# Patient Record
Sex: FEMALE | Race: WHITE | NOT HISPANIC OR LATINO | Employment: FULL TIME
[De-identification: names, ages, dates, MRNs, and addresses within clinical notes are randomized per-mention and may not be internally consistent; named-entity substitution may affect disease eponyms.]

---

## 2018-04-10 RX ORDER — LEVOTHYROXINE SODIUM 100 UG/1
100 TABLET ORAL
COMMUNITY

## 2018-04-10 RX ORDER — NAPROXEN SODIUM 220 MG/1
81 TABLET, FILM COATED ORAL
COMMUNITY
End: 2022-12-14 | Stop reason: ALTCHOICE

## 2018-04-26 ENCOUNTER — TRANSCRIBE ORDERS (OUTPATIENT)
Dept: SCHEDULING | Age: 53
End: 2018-04-26

## 2018-04-26 ENCOUNTER — TELEPHONE (OUTPATIENT)
Dept: OBSTETRICS AND GYNECOLOGY | Facility: CLINIC | Age: 53
End: 2018-04-26

## 2018-04-26 DIAGNOSIS — N64.9 DISORDER OF BREAST: Primary | ICD-10-CM

## 2018-04-26 NOTE — TELEPHONE ENCOUNTER
"ARSLAN,    PT WOULD LIKE TO KNOW IF WE COULD PUT A ORDER IN THE \"SYSTEM\" FOR A MAMMOGRAM SO SHE CAN SCHEDULE It.    THANKSSELENA"

## 2018-05-10 ENCOUNTER — OFFICE VISIT (OUTPATIENT)
Dept: OBSTETRICS AND GYNECOLOGY | Facility: CLINIC | Age: 53
End: 2018-05-10
Attending: OBSTETRICS & GYNECOLOGY
Payer: COMMERCIAL

## 2018-05-10 DIAGNOSIS — Z11.51 SCREENING FOR HUMAN PAPILLOMAVIRUS (HPV): ICD-10-CM

## 2018-05-10 DIAGNOSIS — Z13.29 SCREENING FOR THYROID DISORDER: ICD-10-CM

## 2018-05-10 DIAGNOSIS — Z13.29 SCREENING FOR ENDOCRINE, METABOLIC AND IMMUNITY DISORDER: ICD-10-CM

## 2018-05-10 DIAGNOSIS — Z13.1 SCREENING FOR DIABETES MELLITUS: ICD-10-CM

## 2018-05-10 DIAGNOSIS — N95.1 MENOPAUSE SYNDROME: ICD-10-CM

## 2018-05-10 DIAGNOSIS — Z13.228 SCREENING FOR ENDOCRINE, METABOLIC AND IMMUNITY DISORDER: ICD-10-CM

## 2018-05-10 DIAGNOSIS — Z13.220 SCREENING, LIPID: ICD-10-CM

## 2018-05-10 DIAGNOSIS — Z13.0 SCREENING FOR ENDOCRINE, METABOLIC AND IMMUNITY DISORDER: ICD-10-CM

## 2018-05-10 DIAGNOSIS — Z13.0 SCREENING, ANEMIA, DEFICIENCY, IRON: ICD-10-CM

## 2018-05-10 DIAGNOSIS — Z13.820 SCREENING FOR OSTEOPOROSIS: ICD-10-CM

## 2018-05-10 DIAGNOSIS — Z01.419 WELL FEMALE EXAM WITH ROUTINE GYNECOLOGICAL EXAM: Primary | ICD-10-CM

## 2018-05-10 DIAGNOSIS — Z01.419 PAP SMEAR, LOW-RISK: ICD-10-CM

## 2018-05-10 DIAGNOSIS — Z12.39 SCREENING BREAST EXAMINATION: ICD-10-CM

## 2018-05-10 DIAGNOSIS — Z12.4 ROUTINE CERVICAL SMEAR: ICD-10-CM

## 2018-05-10 PROCEDURE — 81002 URINALYSIS NONAUTO W/O SCOPE: CPT | Performed by: OBSTETRICS & GYNECOLOGY

## 2018-05-10 PROCEDURE — S0612 ANNUAL GYNECOLOGICAL EXAMINA: HCPCS | Performed by: OBSTETRICS & GYNECOLOGY

## 2018-05-10 NOTE — PROGRESS NOTES
5/10/2018  Elisabet Kay is a 52 y.o. female who presents for annual exam.     HPI depression doing well on zoloft 25 mg. Ativan for sleep. Takes conserta.   Total abdominal hysterectomy 2012, for micro invasion of the cervix  hashimoto thyroiditis   Prior dvt    Review of Systems    Mood-  Heart/Lung- no issues  Breast- no issues  Urine/GI- no issues  Menopause-no issues  Menstruation- no issues      Current contraception: no  Last Pap Test:11/03/2016 NORMAL HPV NEGATIVE  Last Mammogram:02/03/2017  Last Dexascan:NONE  Last Colonoscopy:NONE    Family History   Problem Relation Age of Onset   • Uterine cancer Mother        Family history of:  uterine mom    Family history of heart disease:  no  Menstrual History:  OB History     No data available         No LMP recorded. Patient is postmenopausal.       Review of Systems and Physical Exam  The following have been reviewed and updated as appropriate in this visit:      There were no vitals taken for this visit.     OBGyn Exam   Normal exam    Assessment and Plan  To get dexa, labs and mammogram    No orders of the defined types were placed in this encounter.      Diagnoses and all orders for this visit:    Well female exam with routine gynecological exam  -     Cytology, Thinprep Pap  -     POCT urinalysis dipstick  -     PAP AND HR HPV W/REFLEX TO GENOTYPING 16,18/45  -     CBC and Differential; Future  -     Comprehensive metabolic panel; Future  -     Estradiol, Free; Future  -     Follicle stimulating hormone; Future  -     Hemoglobin A1c; Future  -     Lipid panel; Future  -     Progesterone; Future  -     T4, free; Future  -     TSH 3rd Generation; Future  -     Vitamin D 25 hydroxy; Future    Routine cervical smear  -     Cytology, Thinprep Pap  -     PAP AND HR HPV W/REFLEX TO GENOTYPING 16,18/45    Screening for human papillomavirus (HPV)  -     Cytology, Thinprep Pap  -     PAP AND HR HPV W/REFLEX TO GENOTYPING 16,18/45    Pap smear, low-risk  -      Cytology, Thinprep Pap  -     PAP AND HR HPV W/REFLEX TO GENOTYPING 16,18/45    Menopause syndrome  -     CBC and Differential; Future  -     Comprehensive metabolic panel; Future  -     Estradiol, Free; Future  -     Follicle stimulating hormone; Future  -     Hemoglobin A1c; Future  -     Lipid panel; Future  -     Progesterone; Future  -     T4, free; Future  -     TSH 3rd Generation; Future  -     Vitamin D 25 hydroxy; Future    Screening, lipid  -     CBC and Differential; Future  -     Comprehensive metabolic panel; Future  -     Estradiol, Free; Future  -     Follicle stimulating hormone; Future  -     Hemoglobin A1c; Future  -     Lipid panel; Future  -     Progesterone; Future  -     T4, free; Future  -     TSH 3rd Generation; Future  -     Vitamin D 25 hydroxy; Future    Screening for endocrine, metabolic and immunity disorder  -     CBC and Differential; Future  -     Comprehensive metabolic panel; Future  -     Estradiol, Free; Future  -     Follicle stimulating hormone; Future  -     Hemoglobin A1c; Future  -     Lipid panel; Future  -     Progesterone; Future  -     T4, free; Future  -     TSH 3rd Generation; Future  -     Vitamin D 25 hydroxy; Future    Screening for thyroid disorder  -     CBC and Differential; Future  -     Comprehensive metabolic panel; Future  -     Estradiol, Free; Future  -     Follicle stimulating hormone; Future  -     Hemoglobin A1c; Future  -     Lipid panel; Future  -     Progesterone; Future  -     T4, free; Future  -     TSH 3rd Generation; Future  -     Vitamin D 25 hydroxy; Future    Screening for diabetes mellitus  -     CBC and Differential; Future  -     Comprehensive metabolic panel; Future  -     Estradiol, Free; Future  -     Follicle stimulating hormone; Future  -     Hemoglobin A1c; Future  -     Lipid panel; Future  -     Progesterone; Future  -     T4, free; Future  -     TSH 3rd Generation; Future  -     Vitamin D 25 hydroxy; Future    Screening, anemia,  deficiency, iron  -     CBC and Differential; Future  -     Comprehensive metabolic panel; Future  -     Estradiol, Free; Future  -     Follicle stimulating hormone; Future  -     Hemoglobin A1c; Future  -     Lipid panel; Future  -     Progesterone; Future  -     T4, free; Future  -     TSH 3rd Generation; Future  -     Vitamin D 25 hydroxy; Future    Screening breast examination  -     BI SCREENING MAMMOGRAM BILATERAL; Future    Screening for osteoporosis  -     DEXA BONE DENSITY; Future  .  No Follow-up on file.  Amee Traore MA

## 2018-05-15 LAB
CYTOLOGIST CVX/VAG CYTO: NORMAL
CYTOLOGY CVX/VAG DOC THIN PREP: NORMAL
DX ICD CODE: NORMAL
HPV I/H RISK 4 DNA CVX QL PROBE+SIG AMP: NEGATIVE
LAB CORP NOTE:: NORMAL
OTHER STN SPEC: NORMAL
PATH REPORT.FINAL DX SPEC: NORMAL
STAT OF ADQ CVX/VAG CYTO-IMP: NORMAL

## 2018-07-17 ENCOUNTER — HOSPITAL ENCOUNTER (OUTPATIENT)
Dept: RADIOLOGY | Age: 53
Discharge: HOME | End: 2018-07-17
Attending: OBSTETRICS & GYNECOLOGY
Payer: COMMERCIAL

## 2018-07-17 DIAGNOSIS — Z12.39 SCREENING BREAST EXAMINATION: ICD-10-CM

## 2018-07-17 PROCEDURE — 77066 DX MAMMO INCL CAD BI: CPT

## 2018-07-26 ENCOUNTER — TELEPHONE (OUTPATIENT)
Dept: OBSTETRICS AND GYNECOLOGY | Facility: CLINIC | Age: 53
End: 2018-07-26

## 2018-10-03 RX ORDER — SERTRALINE HYDROCHLORIDE 25 MG/1
25 TABLET, FILM COATED ORAL DAILY
Qty: 90 TABLET | Refills: 1 | Status: SHIPPED | OUTPATIENT
Start: 2018-10-03 | End: 2019-07-26 | Stop reason: SDUPTHER

## 2019-05-02 ENCOUNTER — TELEPHONE (OUTPATIENT)
Dept: OBSTETRICS AND GYNECOLOGY | Facility: CLINIC | Age: 54
End: 2019-05-02

## 2019-05-16 ENCOUNTER — OFFICE VISIT (OUTPATIENT)
Dept: OBSTETRICS AND GYNECOLOGY | Facility: CLINIC | Age: 54
End: 2019-05-16
Payer: COMMERCIAL

## 2019-05-16 VITALS
SYSTOLIC BLOOD PRESSURE: 118 MMHG | HEIGHT: 69 IN | DIASTOLIC BLOOD PRESSURE: 78 MMHG | BODY MASS INDEX: 23.25 KG/M2 | WEIGHT: 157 LBS

## 2019-05-16 DIAGNOSIS — E55.9 VITAMIN D DEFICIENCY: ICD-10-CM

## 2019-05-16 DIAGNOSIS — R23.2 HOT FLASHES: ICD-10-CM

## 2019-05-16 DIAGNOSIS — Z84.0 FAMILY HISTORY OF DISCOID LUPUS: ICD-10-CM

## 2019-05-16 DIAGNOSIS — Z13.228 SCREENING FOR PHENYLKETONURIA (PKU): ICD-10-CM

## 2019-05-16 DIAGNOSIS — Z13.29 SCREENING FOR THYROID DISORDER: ICD-10-CM

## 2019-05-16 DIAGNOSIS — Z13.1 SCREENING FOR DIABETES MELLITUS: ICD-10-CM

## 2019-05-16 DIAGNOSIS — Z11.51 SPECIAL SCREENING EXAMINATION FOR HUMAN PAPILLOMAVIRUS (HPV): ICD-10-CM

## 2019-05-16 DIAGNOSIS — Z01.419 WELL FEMALE EXAM WITH ROUTINE GYNECOLOGICAL EXAM: Primary | ICD-10-CM

## 2019-05-16 DIAGNOSIS — Z12.4 ROUTINE CERVICAL SMEAR: ICD-10-CM

## 2019-05-16 PROCEDURE — S0612 ANNUAL GYNECOLOGICAL EXAMINA: HCPCS | Performed by: OBSTETRICS & GYNECOLOGY

## 2019-05-18 LAB
CYTOLOGIST CVX/VAG CYTO: NORMAL
CYTOLOGY CVX/VAG DOC CYTO: NORMAL
DX ICD CODE: NORMAL
LAB CORP NOTE:: NORMAL
OTHER STN SPEC: NORMAL
STAT OF ADQ CVX/VAG CYTO-IMP: NORMAL

## 2019-05-20 ENCOUNTER — TELEPHONE (OUTPATIENT)
Dept: OBSTETRICS AND GYNECOLOGY | Facility: CLINIC | Age: 54
End: 2019-05-20

## 2019-05-22 LAB
ALBUMIN SERPL-MCNC: 4.1 G/DL (ref 3.5–5.5)
ALBUMIN/GLOB SERPL: 1.6 {RATIO} (ref 1.2–2.2)
ALP SERPL-CCNC: 49 IU/L (ref 39–117)
ALT SERPL-CCNC: 15 IU/L (ref 0–32)
AST SERPL-CCNC: 14 IU/L (ref 0–40)
BILIRUB SERPL-MCNC: 0.3 MG/DL (ref 0–1.2)
BUN SERPL-MCNC: 11 MG/DL (ref 6–24)
BUN/CREAT SERPL: 13 (ref 9–23)
CALCIUM SERPL-MCNC: 9 MG/DL (ref 8.7–10.2)
CHLORIDE SERPL-SCNC: 104 MMOL/L (ref 96–106)
CO2 SERPL-SCNC: 23 MMOL/L (ref 20–29)
CREAT SERPL-MCNC: 0.84 MG/DL (ref 0.57–1)
ERYTHROCYTE [DISTWIDTH] IN BLOOD BY AUTOMATED COUNT: 12.4 % (ref 12.3–15.4)
GLOBULIN SER CALC-MCNC: 2.5 G/DL (ref 1.5–4.5)
GLUCOSE SERPL-MCNC: 104 MG/DL (ref 65–99)
HCT VFR BLD AUTO: 38.3 % (ref 34–46.6)
HGB BLD-MCNC: 13 G/DL (ref 11.1–15.9)
LAB CORP EGFR IF AFRICN AM: 92 ML/MIN/1.73
LAB CORP EGFR IF NONAFRICN AM: 80 ML/MIN/1.73
MCH RBC QN AUTO: 31.1 PG (ref 26.6–33)
MCHC RBC AUTO-ENTMCNC: 33.9 G/DL (ref 31.5–35.7)
MCV RBC AUTO: 92 FL (ref 79–97)
PLATELET # BLD AUTO: 290 X10E3/UL (ref 150–450)
POTASSIUM SERPL-SCNC: 4.2 MMOL/L (ref 3.5–5.2)
PROT SERPL-MCNC: 6.6 G/DL (ref 6–8.5)
RBC # BLD AUTO: 4.18 X10E6/UL (ref 3.77–5.28)
SODIUM SERPL-SCNC: 140 MMOL/L (ref 134–144)
WBC # BLD AUTO: 4.9 X10E3/UL (ref 3.4–10.8)

## 2019-05-23 LAB
25(OH)D3+25(OH)D2 SERPL-MCNC: 30.3 NG/ML (ref 30–100)
ANA TITR SER IF: NEGATIVE {TITER}
CHOLEST SERPL-MCNC: 191 MG/DL (ref 100–199)
ESTRADIOL SERPL-MCNC: 5.3 PG/ML
FSH SERPL-ACNC: 135.8 MIU/ML
HBA1C MFR BLD: 5.5 % (ref 4.8–5.6)
HCV AB S/CO SERPL IA: 0.2 S/CO RATIO (ref 0–0.9)
HDLC SERPL-MCNC: 74 MG/DL
LAB CORP PLEASE NOTE:: NORMAL
LDLC SERPL CALC-MCNC: 108 MG/DL (ref 0–99)
LH SERPL-ACNC: 58.6 MIU/ML
T4 FREE SERPL-MCNC: 1.57 NG/DL (ref 0.82–1.77)
TRIGL SERPL-MCNC: 45 MG/DL (ref 0–149)
TSH SERPL DL<=0.005 MIU/L-ACNC: 1.63 UIU/ML (ref 0.45–4.5)
VLDLC SERPL CALC-MCNC: 9 MG/DL (ref 5–40)

## 2019-05-28 ENCOUNTER — TELEPHONE (OUTPATIENT)
Dept: OBSTETRICS AND GYNECOLOGY | Facility: CLINIC | Age: 54
End: 2019-05-28

## 2019-05-28 NOTE — TELEPHONE ENCOUNTER
Pt had blood work done 5/17/19.  She is looking for results .  **pt aware dr muñoz is out of office**

## 2019-05-31 ENCOUNTER — TRANSCRIBE ORDERS (OUTPATIENT)
Dept: SCHEDULING | Age: 54
End: 2019-05-31

## 2019-05-31 DIAGNOSIS — E28.39 OTHER PRIMARY OVARIAN FAILURE: Primary | ICD-10-CM

## 2019-07-26 RX ORDER — SERTRALINE HYDROCHLORIDE 25 MG/1
25 TABLET, FILM COATED ORAL DAILY
Qty: 90 TABLET | Refills: 1 | Status: SHIPPED | OUTPATIENT
Start: 2019-07-26 | End: 2020-01-22 | Stop reason: SDUPTHER

## 2020-01-22 RX ORDER — SERTRALINE HYDROCHLORIDE 25 MG/1
25 TABLET, FILM COATED ORAL DAILY
Qty: 90 TABLET | Refills: 0 | Status: SHIPPED | OUTPATIENT
Start: 2020-01-22 | End: 2020-04-08 | Stop reason: SDUPTHER

## 2020-01-28 ENCOUNTER — TELEPHONE (OUTPATIENT)
Dept: OBSTETRICS AND GYNECOLOGY | Facility: CLINIC | Age: 55
End: 2020-01-28

## 2020-01-28 RX ORDER — LORAZEPAM 1 MG/1
1 TABLET ORAL EVERY 6 HOURS PRN
Qty: 50 TABLET | Refills: 1 | Status: SHIPPED | OUTPATIENT
Start: 2020-01-28 | End: 2021-02-06

## 2020-04-08 ENCOUNTER — TELEPHONE (OUTPATIENT)
Dept: OBSTETRICS AND GYNECOLOGY | Facility: CLINIC | Age: 55
End: 2020-04-08

## 2020-04-08 RX ORDER — SERTRALINE HYDROCHLORIDE 25 MG/1
25 TABLET, FILM COATED ORAL DAILY
Qty: 90 TABLET | Refills: 0 | Status: SHIPPED | OUTPATIENT
Start: 2020-04-08 | End: 2020-06-01 | Stop reason: SDUPTHER

## 2020-06-01 RX ORDER — SERTRALINE HYDROCHLORIDE 25 MG/1
25 TABLET, FILM COATED ORAL DAILY
Qty: 90 TABLET | Refills: 0 | Status: SHIPPED | OUTPATIENT
Start: 2020-06-01 | End: 2020-08-31 | Stop reason: SDUPTHER

## 2020-06-29 ENCOUNTER — TELEPHONE (OUTPATIENT)
Dept: OBSTETRICS AND GYNECOLOGY | Facility: CLINIC | Age: 55
End: 2020-06-29

## 2020-06-29 NOTE — TELEPHONE ENCOUNTER
Pt requested refill for lorazepam. Pt last visit 5/19/19. Pt scheduled for telemed visit on 6/30/20 @ 9:45am.

## 2020-06-30 ENCOUNTER — TELEMEDICINE (OUTPATIENT)
Dept: OBSTETRICS AND GYNECOLOGY | Facility: CLINIC | Age: 55
End: 2020-06-30
Payer: COMMERCIAL

## 2020-06-30 DIAGNOSIS — Z79.899 MEDICATION MANAGEMENT: Primary | ICD-10-CM

## 2020-06-30 DIAGNOSIS — Z12.31 SCREENING MAMMOGRAM, ENCOUNTER FOR: Primary | ICD-10-CM

## 2020-06-30 PROCEDURE — 99212 OFFICE O/P EST SF 10 MIN: CPT | Mod: 95 | Performed by: OBSTETRICS & GYNECOLOGY

## 2020-06-30 RX ORDER — LORAZEPAM 1 MG/1
1 TABLET ORAL EVERY 6 HOURS PRN
Qty: 50 TABLET | Refills: 1 | Status: SHIPPED | OUTPATIENT
Start: 2020-06-30 | End: 2021-02-06

## 2020-06-30 NOTE — PROGRESS NOTES
Verification of Patient Location:  The patient affirms they are currently located in the following state:[place]     Request for Consent:  You and I are about to have a telemedicine check-in or visit. This is allowed because you are already my patient, and you have requested it.  This telemedicine visit will be billed to your health insurance or you, if you are self-insured.  You understand you will be responsible for any copayments or coinsurances that apply to your telemedicine visit.  Before starting our telemedicine visit, I am required to get your consent for this virtual check-in or visit by telemedicine. Do you consent?      Patient Response to Request for Consent: Yes    The following have been reviewed and updated as appropriate in this visit:         Visit Documentation:  Spoke to the patient Elisabet who is been on Ativan utilizing the medication sparingly she is already on medications for ADD written by her primary care Ativan is utilizes for sleep on occasion within the professionally therapy she did not fill in concerns regarding taking the Ativan for renewal        Time Spent in Medical Discussion During This Encounter:  [id number]

## 2020-08-31 RX ORDER — SERTRALINE HYDROCHLORIDE 25 MG/1
25 TABLET, FILM COATED ORAL DAILY
Qty: 90 TABLET | Refills: 0 | Status: SHIPPED | OUTPATIENT
Start: 2020-08-31 | End: 2020-11-19 | Stop reason: SDUPTHER

## 2020-09-17 ENCOUNTER — OFFICE VISIT (OUTPATIENT)
Dept: OBSTETRICS AND GYNECOLOGY | Facility: CLINIC | Age: 55
End: 2020-09-17
Payer: COMMERCIAL

## 2020-09-17 VITALS
WEIGHT: 157 LBS | SYSTOLIC BLOOD PRESSURE: 110 MMHG | BODY MASS INDEX: 23.25 KG/M2 | HEIGHT: 69 IN | DIASTOLIC BLOOD PRESSURE: 84 MMHG

## 2020-09-17 DIAGNOSIS — Z01.419 WELL WOMAN EXAM WITH ROUTINE GYNECOLOGICAL EXAM: ICD-10-CM

## 2020-09-17 DIAGNOSIS — Z12.39 BREAST CANCER SCREENING: ICD-10-CM

## 2020-09-17 DIAGNOSIS — Z12.4 ROUTINE CERVICAL SMEAR: Primary | ICD-10-CM

## 2020-09-17 PROCEDURE — 90471 IMMUNIZATION ADMIN: CPT | Performed by: OBSTETRICS & GYNECOLOGY

## 2020-09-17 PROCEDURE — S0612 ANNUAL GYNECOLOGICAL EXAMINA: HCPCS | Performed by: OBSTETRICS & GYNECOLOGY

## 2020-09-17 PROCEDURE — 90686 IIV4 VACC NO PRSV 0.5 ML IM: CPT | Performed by: OBSTETRICS & GYNECOLOGY

## 2020-09-17 NOTE — PROGRESS NOTES
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    Her mood she is on Concerta prescribed by her primary care doctor along with that she takes Zoloft on and off    Heart lungs are fine    Gynecologic she had a total abdominal hysterectomy 2012 for microinvasion of endocervical cancer she has had normal Paps ever since she still retains her ovaries she has no menopausal symptoms denies any vaginal dryness    Mammogram July 2018 category C    Colonoscopy 2 years ago and normal    Labs we reviewed by telephone with her PCP all appear to be normal    Family history for endometrial cancer dad with prostate    No history for coronary artery disease this patient an LDL of 113 total cholesterol 211 HDL of 81    An echo neck was supple none masses    Breast exam thickened tissue bilaterally but no masses    Abdomen soft nontender    Uterus is surgically missing no adnexal masses good vaginal vault support Pap test taken    Heme deferred    Normal checkup we will see the patient back in 1 year understands the importance of getting a follow-up mammogram

## 2020-09-21 LAB
CYTOLOGIST CVX/VAG CYTO: NORMAL
CYTOLOGY CVX/VAG DOC CYTO: NORMAL
CYTOLOGY CVX/VAG DOC THIN PREP: NORMAL
DX ICD CODE: NORMAL
HPV I/H RISK 4 DNA CVX QL PROBE+SIG AMP: NEGATIVE
LAB CORP NOTE:: NORMAL
OTHER STN SPEC: NORMAL
STAT OF ADQ CVX/VAG CYTO-IMP: NORMAL

## 2020-10-20 ENCOUNTER — TELEPHONE (OUTPATIENT)
Dept: OBSTETRICS AND GYNECOLOGY | Facility: CLINIC | Age: 55
End: 2020-10-20

## 2020-10-21 RX ORDER — LORAZEPAM 1 MG/1
1 TABLET ORAL EVERY 8 HOURS PRN
Qty: 50 TABLET | Refills: 1 | Status: SHIPPED | OUTPATIENT
Start: 2020-10-21 | End: 2021-02-06

## 2020-10-23 ENCOUNTER — TELEPHONE (OUTPATIENT)
Dept: INTENSIVE CARE | Facility: HOSPITAL | Age: 55
End: 2020-10-23

## 2020-10-23 ENCOUNTER — TRANSCRIBE ORDERS (OUTPATIENT)
Dept: OTHER | Facility: CLINIC | Age: 55
End: 2020-10-23

## 2020-10-23 ENCOUNTER — CLINICAL SUPPORT (OUTPATIENT)
Dept: OTHER | Facility: CLINIC | Age: 55
End: 2020-10-23

## 2020-10-23 DIAGNOSIS — R53.83 OTHER FATIGUE: ICD-10-CM

## 2020-10-23 DIAGNOSIS — J02.9 ACUTE PHARYNGITIS, UNSPECIFIED: ICD-10-CM

## 2020-10-23 DIAGNOSIS — J02.9 ACUTE PHARYNGITIS, UNSPECIFIED: Primary | ICD-10-CM

## 2020-10-23 NOTE — PROGRESS NOTES
Patient was processed today at [facility]-19 drive-up clinic.  Pt denies any sort of medical distress today.  After identifying the patient, a nasopharyngeal sample was obtained and placed in viral transport medium.  Pt was given a post-collection education sheet and encouraged to self-isolate until they receive the results.  Pt directed to [facility] website for [facility] Privacy Practices.  Sample sent to the lab noted on the order and requisition.  Pt was without additional questions or concerns and was dispositioned from the testing area to home.

## 2020-10-23 NOTE — TELEPHONE ENCOUNTER
Please schedule this patient for Covid 19 testing.  I have updated the patient's demographic information with the patient's contact information for scheduling.    Patient symptoms:Shortness of breath, Congestion / Runny Nose, Sore throat and Fatigue    Provider is: Provider Select: Independent Provider  (Independent providers with Epic access SHOULD NOT put orders in Epic, results will not route)    Ordering provider (First - Last): PCP  Provider Best Callback # for NSQ: [phone]    This patient is a [employer] Employee: YES/NO/NA: No  If yes: notify Employee Exposure line and advise ordering provider that Employee's also need to call that team.    If an HC provider, advised order needs to be in Epic.    If an independent provider, they are directed to fax the order to [phone]  (Fax should include: patient name, date of birth, Covid 19 [facility] order, ICD-10 for symptoms, and either [facility] account number or fax number for [facility] to send results)    Independent provider results will arrive via ShowEvidence   [facility] provider results will route via Epic

## 2020-11-19 ENCOUNTER — TELEPHONE (OUTPATIENT)
Dept: OBSTETRICS AND GYNECOLOGY | Facility: CLINIC | Age: 55
End: 2020-11-19

## 2020-11-19 RX ORDER — SERTRALINE HYDROCHLORIDE 25 MG/1
25 TABLET, FILM COATED ORAL DAILY
Qty: 90 TABLET | Refills: 0 | Status: SHIPPED | OUTPATIENT
Start: 2020-11-19 | End: 2020-11-23 | Stop reason: SDUPTHER

## 2020-11-23 RX ORDER — SERTRALINE HYDROCHLORIDE 25 MG/1
25 TABLET, FILM COATED ORAL DAILY
Qty: 90 TABLET | Refills: 0 | Status: SHIPPED | OUTPATIENT
Start: 2020-11-23 | End: 2021-04-19 | Stop reason: SDUPTHER

## 2021-02-06 RX ORDER — LORAZEPAM 1 MG/1
1 TABLET ORAL NIGHTLY PRN
Qty: 2 TABLET | Refills: 0 | Status: SHIPPED | OUTPATIENT
Start: 2021-02-06 | End: 2023-10-10

## 2021-02-06 NOTE — PROGRESS NOTES
Pt called for refill of Ativan. Tried calling office earlier in week but never heard back. This is a controlled substance and pt is unknown to me. Will refill through Monday at which point she needs to speak with Dr. Currie's office for further refills. PDMP query performed.

## 2021-02-09 ENCOUNTER — TELEMEDICINE (OUTPATIENT)
Dept: OBSTETRICS AND GYNECOLOGY | Facility: CLINIC | Age: 56
End: 2021-02-09
Payer: COMMERCIAL

## 2021-02-09 DIAGNOSIS — Z79.899 MEDICATION MANAGEMENT: Primary | ICD-10-CM

## 2021-02-09 PROCEDURE — 99212 OFFICE O/P EST SF 10 MIN: CPT | Mod: 95 | Performed by: OBSTETRICS & GYNECOLOGY

## 2021-02-09 RX ORDER — LORAZEPAM 1 MG/1
1 TABLET ORAL EVERY 6 HOURS PRN
Qty: 30 TABLET | Refills: 0 | Status: SHIPPED | OUTPATIENT
Start: 2021-02-09 | End: 2022-12-14 | Stop reason: ALTCHOICE

## 2021-02-09 NOTE — PROGRESS NOTES
Verification of Patient Location:  The patient affirms they are currently located in the following state:[place]     Request for Consent:  You and I are about to have a telemedicine check-in or visit. This is allowed because you are already my patient, and you have requested it.  This telemedicine visit will be billed to your health insurance or you, if you are self-insured.  You understand you will be responsible for any copayments or coinsurances that apply to your telemedicine visit.  Before starting our telemedicine visit, I am required to get your consent for this virtual check-in or visit by telemedicine. Do you consent?      Patient Response to Request for Consent: Yes    The following have been reviewed and updated as appropriate in this visit:         Visit Documentation:  Patient has been taking Ativan for quite some time for sleep and anxiety she has had no psychological or physical additions with medications written for 30 days after receipt of the responsibility back over to her medical doctor since she is on ADD drug I think the combination is best managed by her medical doc        Time Spent:  I spent 8 minutes on this date of service performing the following activities: obtaining history and coordinating care.

## 2021-04-19 ENCOUNTER — TELEPHONE (OUTPATIENT)
Dept: OBSTETRICS AND GYNECOLOGY | Facility: CLINIC | Age: 56
End: 2021-04-19

## 2021-04-19 RX ORDER — SERTRALINE HYDROCHLORIDE 25 MG/1
25 TABLET, FILM COATED ORAL DAILY
Qty: 90 TABLET | Refills: 0 | Status: SHIPPED | OUTPATIENT
Start: 2021-04-19 | End: 2021-07-26 | Stop reason: SDUPTHER

## 2021-04-19 NOTE — TELEPHONE ENCOUNTER
He Araceli sure what this request for medication is could have been just automatically generated Elisabet over the last 3 months has been getting her Ativan for her from her primary care doctor along with her Concerta

## 2021-04-19 NOTE — TELEPHONE ENCOUNTER
Medicine Refill Request    Last Office Visit: 9/17/2020  Last Telemedicine Visit: 2/9/2021 Rupesh Currie MD    Next Office Visit: Visit date not found  Next Telemedicine Visit: Visit date not found         Current Outpatient Medications:   •  aspirin 81 mg chewable tablet, Take 81 mg by mouth., Disp: , Rfl:   •  levothyroxine (SYNTHROID) 100 mcg tablet, Take 100 mcg by mouth., Disp: , Rfl:   •  LORazepam (ATIVAN) 1 mg tablet, Take 1 tablet (1 mg total) by mouth nightly as needed for sleep for up to 2 days., Disp: 2 tablet, Rfl: 0  •  LORazepam (ATIVAN) 1 mg tablet, Take 1 tablet (1 mg total) by mouth every 6 (six) hours as needed for anxiety., Disp: 30 tablet, Rfl: 0  •  METHYLPHENIDATE HCL (CONCERTA ORAL), Take by mouth., Disp: , Rfl:   •  sertraline (ZOLOFT) 25 mg tablet, Take 1 tablet (25 mg total) by mouth daily., Disp: 90 tablet, Rfl: 0      BP Readings from Last 3 Encounters:   09/17/20 110/84   05/16/19 118/78       Recent Lab results:  Lab Results   Component Value Date    CHOL 191 05/22/2019   ,   Lab Results   Component Value Date    HDL 74 05/22/2019   ,   Lab Results   Component Value Date    LDLCALC 108 (H) 05/22/2019   ,   Lab Results   Component Value Date    TRIG 45 05/22/2019        Lab Results   Component Value Date    GLUCOSE 104 (H) 05/22/2019   ,   Lab Results   Component Value Date    HGBA1C 5.5 05/22/2019         Lab Results   Component Value Date    CREATININE 0.84 05/22/2019       Lab Results   Component Value Date    TSH 1.630 05/22/2019      PT WOULD LIKE A REFILL ON SERTRALINE HCL 25MG

## 2021-04-20 ENCOUNTER — TELEPHONE (OUTPATIENT)
Dept: OBSTETRICS AND GYNECOLOGY | Facility: CLINIC | Age: 56
End: 2021-04-20

## 2021-04-20 NOTE — TELEPHONE ENCOUNTER
Patient called stating Dr. Currie asked her to call the office to let us know that he is not refilling any medications. Her primary care is

## 2021-07-26 RX ORDER — SERTRALINE HYDROCHLORIDE 25 MG/1
25 TABLET, FILM COATED ORAL DAILY
Qty: 90 TABLET | Refills: 0 | Status: SHIPPED | OUTPATIENT
Start: 2021-07-26 | End: 2021-11-29 | Stop reason: SDUPTHER

## 2021-11-15 ENCOUNTER — TELEPHONE (OUTPATIENT)
Dept: OBSTETRICS AND GYNECOLOGY | Facility: CLINIC | Age: 56
End: 2021-11-15

## 2021-11-15 NOTE — TELEPHONE ENCOUNTER
Patient is requesting screening mammogram and dexa scan orderr to be placed on her chart.   Thanks

## 2021-11-18 ENCOUNTER — HOSPITAL ENCOUNTER (OUTPATIENT)
Dept: RADIOLOGY | Age: 56
Discharge: HOME | End: 2021-11-18
Attending: OBSTETRICS & GYNECOLOGY
Payer: COMMERCIAL

## 2021-11-18 DIAGNOSIS — Z12.31 ENCOUNTER FOR SCREENING MAMMOGRAM FOR MALIGNANT NEOPLASM OF BREAST: ICD-10-CM

## 2021-11-18 DIAGNOSIS — Z12.31 ENCOUNTER FOR SCREENING MAMMOGRAM FOR MALIGNANT NEOPLASM OF BREAST: Primary | ICD-10-CM

## 2021-11-18 PROCEDURE — 77067 SCR MAMMO BI INCL CAD: CPT

## 2021-11-18 PROCEDURE — 77063 BREAST TOMOSYNTHESIS BI: CPT

## 2021-11-29 NOTE — TELEPHONE ENCOUNTER
Medicine Refill Request    Last Office Visit: 9/17/2020  Last Telemedicine Visit: 2/9/2021 Rupesh Currie MD    Next Office Visit: Visit date not found  Next Telemedicine Visit: Visit date not found         Current Outpatient Medications:   •  aspirin 81 mg chewable tablet, Take 81 mg by mouth., Disp: , Rfl:   •  levothyroxine (SYNTHROID) 100 mcg tablet, Take 100 mcg by mouth., Disp: , Rfl:   •  LORazepam (ATIVAN) 1 mg tablet, Take 1 tablet (1 mg total) by mouth nightly as needed for sleep for up to 2 days., Disp: 2 tablet, Rfl: 0  •  LORazepam (ATIVAN) 1 mg tablet, Take 1 tablet (1 mg total) by mouth every 6 (six) hours as needed for anxiety., Disp: 30 tablet, Rfl: 0  •  METHYLPHENIDATE HCL (CONCERTA ORAL), Take by mouth., Disp: , Rfl:   •  sertraline (ZOLOFT) 25 mg tablet, Take 1 tablet (25 mg total) by mouth daily., Disp: 90 tablet, Rfl: 0      BP Readings from Last 3 Encounters:   09/17/20 110/84   05/16/19 118/78       Recent Lab results:  Lab Results   Component Value Date    CHOL 191 05/22/2019   ,   Lab Results   Component Value Date    HDL 74 05/22/2019   ,   Lab Results   Component Value Date    LDLCALC 108 (H) 05/22/2019   ,   Lab Results   Component Value Date    TRIG 45 05/22/2019        Lab Results   Component Value Date    GLUCOSE 104 (H) 05/22/2019   ,   Lab Results   Component Value Date    HGBA1C 5.5 05/22/2019         Lab Results   Component Value Date    CREATININE 0.84 05/22/2019       Lab Results   Component Value Date    TSH 1.630 05/22/2019

## 2021-12-04 RX ORDER — SERTRALINE HYDROCHLORIDE 25 MG/1
25 TABLET, FILM COATED ORAL DAILY
Qty: 90 TABLET | Refills: 0 | Status: SHIPPED | OUTPATIENT
Start: 2021-12-04 | End: 2022-02-17 | Stop reason: SDUPTHER

## 2022-01-25 ENCOUNTER — TRANSCRIBE ORDERS (OUTPATIENT)
Dept: SCHEDULING | Age: 57
End: 2022-01-25

## 2022-01-25 DIAGNOSIS — M25.562 PAIN IN LEFT KNEE: Primary | ICD-10-CM

## 2022-01-25 DIAGNOSIS — M71.22 SYNOVIAL CYST OF POPLITEAL SPACE (BAKER), LEFT KNEE: Primary | ICD-10-CM

## 2022-01-28 ENCOUNTER — HOSPITAL ENCOUNTER (OUTPATIENT)
Dept: RADIOLOGY | Age: 57
Discharge: HOME | End: 2022-01-28
Attending: FAMILY MEDICINE
Payer: COMMERCIAL

## 2022-01-28 DIAGNOSIS — M25.562 PAIN IN LEFT KNEE: ICD-10-CM

## 2022-01-28 PROCEDURE — 73564 X-RAY EXAM KNEE 4 OR MORE: CPT | Mod: LT

## 2022-02-10 ENCOUNTER — TRANSCRIBE ORDERS (OUTPATIENT)
Dept: SCHEDULING | Age: 57
End: 2022-02-10

## 2022-02-11 ENCOUNTER — HOSPITAL ENCOUNTER (OUTPATIENT)
Dept: RADIOLOGY | Age: 57
Discharge: HOME | End: 2022-02-11
Attending: OBSTETRICS & GYNECOLOGY
Payer: COMMERCIAL

## 2022-02-11 DIAGNOSIS — Z12.31 ENCOUNTER FOR SCREENING MAMMOGRAM FOR MALIGNANT NEOPLASM OF BREAST: ICD-10-CM

## 2022-02-11 PROCEDURE — 77080 DXA BONE DENSITY AXIAL: CPT

## 2022-02-15 ENCOUNTER — HOSPITAL ENCOUNTER (OUTPATIENT)
Dept: RADIOLOGY | Facility: CLINIC | Age: 57
Discharge: HOME | End: 2022-02-15
Attending: FAMILY MEDICINE
Payer: COMMERCIAL

## 2022-02-15 DIAGNOSIS — M71.22 SYNOVIAL CYST OF POPLITEAL SPACE (BAKER), LEFT KNEE: ICD-10-CM

## 2022-02-15 PROCEDURE — 76882 US LMTD JT/FCL EVL NVASC XTR: CPT | Mod: LT

## 2022-02-21 RX ORDER — SERTRALINE HYDROCHLORIDE 25 MG/1
25 TABLET, FILM COATED ORAL DAILY
Qty: 30 TABLET | Refills: 0 | Status: SHIPPED | OUTPATIENT
Start: 2022-02-21 | End: 2022-03-03 | Stop reason: SDUPTHER

## 2022-03-03 RX ORDER — SERTRALINE HYDROCHLORIDE 25 MG/1
25 TABLET, FILM COATED ORAL DAILY
Qty: 30 TABLET | Refills: 0 | Status: SHIPPED | OUTPATIENT
Start: 2022-03-03 | End: 2022-07-12 | Stop reason: SDUPTHER

## 2022-05-23 ENCOUNTER — OFFICE VISIT (OUTPATIENT)
Dept: OBSTETRICS AND GYNECOLOGY | Facility: CLINIC | Age: 57
End: 2022-05-23
Payer: COMMERCIAL

## 2022-05-23 VITALS — WEIGHT: 169.2 LBS | DIASTOLIC BLOOD PRESSURE: 78 MMHG | BODY MASS INDEX: 25.17 KG/M2 | SYSTOLIC BLOOD PRESSURE: 120 MMHG

## 2022-05-23 DIAGNOSIS — Z12.4 ROUTINE CERVICAL SMEAR: Primary | ICD-10-CM

## 2022-05-23 PROCEDURE — 3008F BODY MASS INDEX DOCD: CPT | Performed by: OBSTETRICS & GYNECOLOGY

## 2022-05-23 PROCEDURE — 99396 PREV VISIT EST AGE 40-64: CPT | Performed by: OBSTETRICS & GYNECOLOGY

## 2022-07-12 RX ORDER — SERTRALINE HYDROCHLORIDE 25 MG/1
25 TABLET, FILM COATED ORAL DAILY
Qty: 90 TABLET | Refills: 3 | Status: SHIPPED | OUTPATIENT
Start: 2022-07-12 | End: 2023-10-10 | Stop reason: HOSPADM

## 2022-09-08 NOTE — PROGRESS NOTES
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 and a 15-year-old daughter    For mood she takes Concerta along with Zoloft doing well    Urine GI noncontributory    Heart lungs noncontributory    Gynecologic no issues    Had a total abdominal hysterectomy 2012 for atypical hyperplasia she has no symptoms of the menopause    Mammogram November 2021 category B    DEXA scan spine is 0.6 hips -1 0.1-0.4 this is November 2021 good study should repeat in 3 to 5 years    Pap test September 2020    Labs 2019 and normal    COVID-vaccine    Mom had endometrial cancer at the age of 47 her father had some form of blood cancer    Neck supple no masses    Breast exam showed no masses    Abdomen soft nontender    Uterus surgically missing no adnexal masses vaginal vault well supported    Normal GYN checkup gynecological plan is as above

## 2022-12-14 ENCOUNTER — TELEPHONE (OUTPATIENT)
Dept: OBSTETRICS AND GYNECOLOGY | Facility: CLINIC | Age: 57
End: 2022-12-14
Payer: COMMERCIAL

## 2022-12-14 DIAGNOSIS — Z12.31 ENCOUNTER FOR SCREENING MAMMOGRAM FOR MALIGNANT NEOPLASM OF BREAST: ICD-10-CM

## 2022-12-14 DIAGNOSIS — Z01.419 WELL WOMAN EXAM WITH ROUTINE GYNECOLOGICAL EXAM: Primary | ICD-10-CM

## 2022-12-14 NOTE — TELEPHONE ENCOUNTER
Patient called requesting a script for a routine mammogram.  Pt has an appt scheduled for 12/26/2022.  Please send.  Thank you

## 2022-12-28 ENCOUNTER — NEW PATIENT (OUTPATIENT)
Dept: URBAN - METROPOLITAN AREA CLINIC 53 | Facility: CLINIC | Age: 57
End: 2022-12-28

## 2022-12-28 DIAGNOSIS — H52.13: ICD-10-CM

## 2022-12-28 DIAGNOSIS — H25.13: ICD-10-CM

## 2022-12-28 PROCEDURE — 92004 COMPRE OPH EXAM NEW PT 1/>: CPT

## 2022-12-28 PROCEDURE — 92015 DETERMINE REFRACTIVE STATE: CPT

## 2022-12-28 ASSESSMENT — VISUAL ACUITY
OD_CC: 20/20
OU_CC: J1+
OS_CC: 20/20

## 2022-12-28 ASSESSMENT — KERATOMETRY
OS_K1POWER_DIOPTERS: 41.75
OS_K2POWER_DIOPTERS: 43.75
OD_K2POWER_DIOPTERS: 44.00
OD_AXISANGLE_DEGREES: 168
OS_AXISANGLE2_DEGREES: 96
OS_AXISANGLE_DEGREES: 6
OD_K1POWER_DIOPTERS: 42.25
OD_AXISANGLE2_DEGREES: 78

## 2022-12-28 ASSESSMENT — TONOMETRY
OD_IOP_MMHG: 14
OS_IOP_MMHG: 13

## 2023-03-02 ENCOUNTER — HOSPITAL ENCOUNTER (OUTPATIENT)
Dept: RADIOLOGY | Age: 58
Discharge: HOME | End: 2023-03-02
Attending: OBSTETRICS & GYNECOLOGY
Payer: COMMERCIAL

## 2023-03-02 DIAGNOSIS — Z12.31 ENCOUNTER FOR SCREENING MAMMOGRAM FOR MALIGNANT NEOPLASM OF BREAST: ICD-10-CM

## 2023-03-02 PROCEDURE — 77063 BREAST TOMOSYNTHESIS BI: CPT

## 2023-04-25 ENCOUNTER — TRANSCRIBE ORDERS (OUTPATIENT)
Dept: SCHEDULING | Age: 58
End: 2023-04-25

## 2023-04-25 DIAGNOSIS — E78.00 PURE HYPERCHOLESTEROLEMIA, UNSPECIFIED: Primary | ICD-10-CM

## 2023-05-04 ENCOUNTER — HOSPITAL ENCOUNTER (OUTPATIENT)
Dept: RADIOLOGY | Age: 58
Discharge: HOME | End: 2023-05-04
Attending: FAMILY MEDICINE

## 2023-05-04 DIAGNOSIS — E78.00 PURE HYPERCHOLESTEROLEMIA, UNSPECIFIED: ICD-10-CM

## 2023-05-04 PROCEDURE — 75571 CT HRT W/O DYE W/CA TEST: CPT

## 2023-06-29 ENCOUNTER — OFFICE VISIT (OUTPATIENT)
Dept: OBSTETRICS AND GYNECOLOGY | Facility: CLINIC | Age: 58
End: 2023-06-29
Payer: COMMERCIAL

## 2023-06-29 VITALS — WEIGHT: 156.6 LBS | SYSTOLIC BLOOD PRESSURE: 120 MMHG | DIASTOLIC BLOOD PRESSURE: 80 MMHG | BODY MASS INDEX: 23.29 KG/M2

## 2023-06-29 DIAGNOSIS — Z80.9 FAMILY HISTORY OF CANCER: ICD-10-CM

## 2023-06-29 DIAGNOSIS — Z12.31 ENCOUNTER FOR SCREENING MAMMOGRAM FOR MALIGNANT NEOPLASM OF BREAST: ICD-10-CM

## 2023-06-29 DIAGNOSIS — Z01.419 WELL WOMAN EXAM WITH ROUTINE GYNECOLOGICAL EXAM: ICD-10-CM

## 2023-06-29 DIAGNOSIS — Z12.4 PAP SMEAR FOR CERVICAL CANCER SCREENING: ICD-10-CM

## 2023-06-29 DIAGNOSIS — N63.11 MASS OF UPPER OUTER QUADRANT OF RIGHT BREAST: Primary | ICD-10-CM

## 2023-06-29 PROCEDURE — S0612 ANNUAL GYNECOLOGICAL EXAMINA: HCPCS | Performed by: OBSTETRICS & GYNECOLOGY

## 2023-06-29 RX ORDER — ESTRADIOL 1 MG/G
GEL TOPICAL DAILY
Qty: 30 G | Refills: 6 | Status: ON HOLD | OUTPATIENT
Start: 2023-06-29 | End: 2023-10-10

## 2023-06-29 RX ORDER — BUPROPION HYDROCHLORIDE 150 MG/1
TABLET ORAL
COMMUNITY
Start: 2023-06-26

## 2023-06-29 RX ORDER — TRETINOIN 0.25 MG/G
CREAM TOPICAL
Status: ON HOLD | COMMUNITY
Start: 2023-05-26 | End: 2023-10-10

## 2023-06-29 RX ORDER — METHYLPHENIDATE HYDROCHLORIDE 54 MG/1
54 TABLET, EXTENDED RELEASE ORAL DAILY
COMMUNITY
Start: 2023-06-15

## 2023-06-29 NOTE — PROGRESS NOTES
Elisabet seen today 57 years old she is a 19-year-old daughter Le at [employer] along with a 17-year-old daughter Jessica who is now a senior    For mood she is on Ritalin and Wellbutrin    Urine GI noncontributory    Heart lungs noncontributory    Gynecologic in 2012 had a total abdominal hysterectomy bilateral salpingo-oophorectomy for atypical hyperplasia    Mammogram was March 2023 category B    Colonoscopy every 5 to 10 years last was in 2020    Labs were recent    DEXA scan 11/8/2021 and normal to recheck in 3 to 5 years    Pap test lasting    Calcium index was 0 in 5/8/2023 she get a follow-up in 5 years    Mother had endometrial cancer her father had a blood cancer    Given the family history for cancer of suggest that she consider getting genetics    Patient had some significant gynecological symptomatology we did do a Divigel trial to see if anything improves if so she could continue with the medication    Her neck was supple no masses    Breast exam showed no masses    Abdomen was soft nontender    Uterus was midposition no Nexa masses    Normal GYN checkup plan as above

## 2023-06-30 ENCOUNTER — TELEPHONE (OUTPATIENT)
Dept: GENETICS | Facility: HOSPITAL | Age: 58
End: 2023-06-30
Payer: COMMERCIAL

## 2023-06-30 NOTE — TELEPHONE ENCOUNTER
Called Elisabet Kay regarding scheduling a cancer genetic counseling appointment per referral from Dr. Currie. I provided information about the program and Elisabet Kay was interested in scheduling an appointment but not at the time of this encounter. I provided the number to the general scheduling to schedule an appointment at a more convenient time.

## 2023-07-02 LAB
BACTERIA UR CULT: NORMAL

## 2023-07-06 LAB
CYTOLOGIST CVX/VAG CYTO: NORMAL
CYTOLOGY CVX/VAG DOC CYTO: NORMAL
CYTOLOGY CVX/VAG DOC THIN PREP: NORMAL
DX ICD CODE: NORMAL
HPV I/H RISK 4 DNA CVX QL PROBE+SIG AMP: NEGATIVE
LAB CORP NOTE:: NORMAL
LC HPV GENOTYPE REFLEX: NORMAL
OTHER STN SPEC: NORMAL
STAT OF ADQ CVX/VAG CYTO-IMP: NORMAL

## 2023-07-07 ENCOUNTER — TRANSCRIBE ORDERS (OUTPATIENT)
Dept: SCHEDULING | Age: 58
End: 2023-07-07

## 2023-07-07 ENCOUNTER — TELEPHONE (OUTPATIENT)
Dept: OBSTETRICS AND GYNECOLOGY | Facility: CLINIC | Age: 58
End: 2023-07-07

## 2023-07-07 DIAGNOSIS — N63.11 UNSPECIFIED LUMP IN THE RIGHT BREAST, UPPER OUTER QUADRANT: Primary | ICD-10-CM

## 2023-07-18 ENCOUNTER — HOSPITAL ENCOUNTER (OUTPATIENT)
Dept: RADIOLOGY | Age: 58
Discharge: HOME | End: 2023-07-18
Attending: OBSTETRICS & GYNECOLOGY
Payer: COMMERCIAL

## 2023-07-18 DIAGNOSIS — N63.11 MASS OF UPPER OUTER QUADRANT OF RIGHT BREAST: ICD-10-CM

## 2023-07-18 DIAGNOSIS — N63.11 UNSPECIFIED LUMP IN THE RIGHT BREAST, UPPER OUTER QUADRANT: ICD-10-CM

## 2023-07-18 PROCEDURE — 76642 ULTRASOUND BREAST LIMITED: CPT | Mod: RT

## 2023-07-18 PROCEDURE — G0279 TOMOSYNTHESIS, MAMMO: HCPCS | Mod: RT

## 2023-08-24 ENCOUNTER — OFFICE VISIT (OUTPATIENT)
Dept: SURGERY | Facility: CLINIC | Age: 58
End: 2023-08-24
Payer: COMMERCIAL

## 2023-08-24 VITALS
HEIGHT: 69 IN | DIASTOLIC BLOOD PRESSURE: 78 MMHG | BODY MASS INDEX: 23.25 KG/M2 | OXYGEN SATURATION: 99 % | HEART RATE: 61 BPM | SYSTOLIC BLOOD PRESSURE: 122 MMHG | WEIGHT: 157 LBS | TEMPERATURE: 97.4 F

## 2023-08-24 DIAGNOSIS — N63.10 MASS OF RIGHT BREAST, UNSPECIFIED QUADRANT: Primary | ICD-10-CM

## 2023-08-24 PROCEDURE — 3008F BODY MASS INDEX DOCD: CPT | Performed by: NURSE PRACTITIONER

## 2023-08-24 PROCEDURE — 99203 OFFICE O/P NEW LOW 30 MIN: CPT | Performed by: NURSE PRACTITIONER

## 2023-08-24 RX ORDER — CHOLECALCIFEROL (VITAMIN D3) 25 MCG
1000 TABLET ORAL DAILY
COMMUNITY

## 2023-08-24 NOTE — PROGRESS NOTES
[facility]  VICENTA Bazzi  [address] [address]  Phone: [phone]  Fax: [phone]      Patient ID: Elisabet Kay                              : 1965    Visit Date: 2023  Referring Provider: Armando Ritchie MD   PCP: Armando Ritchie MD  GYN: No care team member to display    Chief Complaint: Breast Mass      Elisabet Kay is a 57 y.o.  female who presents today in consult for a palpable lump in her right breast.   She reports that shes unsure if it has always been there and doesn't feel its changed.  Diagnostic imaging was unrevealing.  She reports no family history of breast or ovarian cancer, but does note aggressive prostate cancer in her brother as well as endometrial cancer in her mother.   She has already been recommended and is considering hereditary cancer genetics.           Breast Health:  Age at menarche: 12  Age at first live birth: 39   Age of menopause: 52     Allergies: No known drug allergies    Current Medications: has a current medication list which includes the following prescription(s): bupropion xl, cholecalciferol (vitamin d3), concerta, estradiol, levothyroxine, lorazepam, sertraline, methylphenidate hcl, and tretinoin.    Past Medical History:  has a past medical history of Arm DVT (deep venous thromboembolism), acute (CMS/HCC).    Past Surgical History:  has a past surgical history that includes Total abdominal hysterectomy; Dilation and curettage of uterus; Cervical cone biopsy; and [place] tooth extraction.    Social History:   Social History     Tobacco Use   • Smoking status: Some Days   • Smokeless tobacco: Never   Vaping Use   • Vaping Use: Never used   Substance Use Topics   • Alcohol use: Yes     Comment: SOCIALLY   • Drug use: No       Family History: family history includes Endometrial cancer in her biological mother; Lung cancer in her maternal grandfather; Prostate cancer in her biological  "father.        Physical Exam:    Vitals:   Visit Vitals  /78   Pulse 61   Temp 36.3 °C (97.4 °F)   Ht 1.753 m (5' 9\")   Wt 71.2 kg (157 lb) Comment: without shoes   SpO2 99%   BMI 23.18 kg/m²     Body mass index is 23.18 kg/m².    Physical Exam  Physical Examination performed in the supine and sitting position  BREAST SIZE:  SYMMETRY yes       SKIN - Skin color, texture, turgor normal. No rashes or lesions Skin is without tethering, dimpling, retraction or increased vascularity  AREOLA - normal    NIPPLES -  normal without retraction and without discharge  LYMPH NODES: infra, supra, cervical, parasternal and axillary nodes normal bilaterally  BREAST TISSUE: Right breast normal without discrete lesions-   In the upper outer quadrant there is dense tissue - In office u/s reveals fibrocystic tissue . Left Breast  normal without discrete lesions.       Breast Imaging: I have personally reviewed the reports and images as follows.  Diagnostic imaging 7/18/23  CLINICAL HISTORY:  N63.11: Unspecified lump in the right breast, upper outer quadrant     Diagnostic mammogram. Right breast lump. This patient's lifetime risk of breast  cancer according to a modified Nataliya model, calculated based on information  provided on the patient questionnaire, is 10.7%.     COMMENT:  Full field unilateral right diagnostic digital mammography was performed  including 3-D tomosynthesis imaging.  Computer assisted detection was utilized  during the interpretation of this examination.     Comparison is made to available prior images.     The breast tissue is heterogeneously dense, which may obscure small masses  (breast density type C).     No suspicious mass is seen. There is no suspicious architectural distortion.  There are no suspicious calcifications. There is no suspicious interval change.  There is no suspicious mammographic abnormality or change identified in the  upper outer quadrant of the right breast at the site of the " triangular palpable  lump marker.     Targeted right breast ultrasound was performed by the ultrasound technologist  and by Dr. Dowd to further evaluate the palpable lump as localized by the  patient. There is no suspicious sonographic abnormality identified at 10:00 8 to  9 cm from the nipple at the site of the palpable right breast lump.     --  IMPRESSION:  1.  No evidence of malignancy. Annual mammography is recommended, noting the  patient is due for annual screening mammogram in March 2024. If there is a  palpable abnormality, management must be based on clinical grounds. There is  approximately 3% incidence of breast cancer with negative mammography and  negative ultrasound. Breast surgical consultation and/or MRI could be considered  for an area of clinical concern with negative mammography and ultrasound.  These results and recommendations were discussed with and acknowledged by the  patient and were given to the patient in writing at the time of the examination.        FINAL ASSESSMENT - BI-RADS CATEGORY 1 - NEGATIVE  (NORM8B,  NORMSY)  HETEROGENEOUS  The patient's information has been entered into our automated reminder   Impression:  Right breast lump vs pther  Recommendation and Plan:   This is a 57-year-old female who presented to the office today in consultation for a possible mass in her upper outer right breast.  On exam, it does appear to be consistent with dense fibrocystic tissue.  Imaging was reassuring as was her exam today.  We did review this as well as recommendations for repeat clinical exam in 2 to 3 months.  She is amenable and will return to the office sooner should she find a change in her self breast exam.  Lastly, she was reminded to continue with her routine and preventative health care.    All of the questions were answered.  The patient is in agreement with the treatment plan.      No follow-ups on file.        8/24/2023   1:51 PM      VICENTA Gurrola

## 2023-09-28 ENCOUNTER — CLINICAL SUPPORT (OUTPATIENT)
Dept: GENETICS | Facility: HOSPITAL | Age: 58
End: 2023-09-28
Attending: INTERNAL MEDICINE
Payer: COMMERCIAL

## 2023-09-28 DIAGNOSIS — Z71.83 ENCOUNTER FOR NONPROCREATIVE GENETIC COUNSELING: Primary | ICD-10-CM

## 2023-09-28 DIAGNOSIS — Z80.49 FHX: UTERINE CANCER: ICD-10-CM

## 2023-09-28 PROCEDURE — 96040 HC GENETICS COUNSELING SESSIONS: CPT | Performed by: GENETIC COUNSELOR, MS

## 2023-09-28 NOTE — PROGRESS NOTES
"Patient Name: Elisabet Kay  Patient Legal Name: Elisabet Kay  : 1965           Indication for Appointment:  Elisabet Kay presented for genetic counseling and cancer risk assessment at [facility] due to a family history of prostate and endometrial cancer. Elisabet was referred by Rupesh Currie MD and presented to the session alone.    Personal History:   Elisabet is 57 y.o. female of Malian, English, Latvian and [place] descent with primary visit diagnosis of Encounter for nonprocreative genetic counseling [Z71.83].    Past Medical History:   Diagnosis Date    Arm DVT (deep venous thromboembolism), acute (CMS/HCC)     right arm      Past Medical History Pertinent Negatives:   Denies History Of: Date Noted    Disease of thyroid gland 2023    Fibroid 2023    Melanoma (CMS/HCC) 2023     Past Surgical History:   Procedure Laterality Date    Cervical cone biopsy      Colonoscopy      no polyps per patient    Dilation and curettage of uterus      Knee surgery Right     at age 25, meniscus tear    Total abdominal hysterectomy  2012    Severe squamous dysplasia, focal adenomyosis ([id number])    [place] tooth extraction       Past Surgical History Pertinent Negatives:   Denies History Of: Date Noted    Breast biopsy 2023    Oophorectomy 2023        Height/Weight:  Height: 1.753 m (5' 9\")  Weight: 71.2 kg (157 lb) (without shoes)    Gynecologic History:  Menarche Age: 12 years  Age at first live birth: 38  Menopause Status: Post-Menopause  Age at menopause: 54  Use of hormonal contraceptives: Yes (BCPs ages 21-36 years)  Use of fertility medications: No  Use of hormone replacement therapy: No  Use of Tamoxifen/Evista: No    Social History     Tobacco Use    Smoking status: Some Days     Types: Cigarettes     Passive exposure: Past    Smokeless tobacco: Never    Tobacco comments:     5 cigarettes a week   Vaping Use    Vaping Use: Never used "   Substance Use Topics    Alcohol use: Yes     Alcohol/week: 8.0 standard drinks of alcohol     Types: 8 Standard drinks or equivalent per week    Drug use: No       Family History:  See completed family history in pedigree below.              Genetic Education/Risk Assessment/Counseling:  Information was provided about the relationship between genes and cancer.  The concept of hereditary cancer was defined.  Natural history, risks and inheritance patterns of  cancer-associated genes were reviewed, as related to Erin personal and/or family history.  Related psychosocial aspects were discussed.    Discussion of Genetic Testing:  The pros, cons, and limitations of testing for genetic susceptibility were discussed, including but not limited to test options, possible results, potential impact on management, and psychosocial aspects.  There may be limited data on the degree of cancer risk and/or no defined management guidelines associated with some genes.  If applicable, risk assessment models and/or published tables were used to provide a mutation probability estimate. Limitations of assessment were reviewed.    Given the reported personal and/or family history, genetic testing was offered but declined at this time. With the reported family history of cancer, Elisabet does not meet NCCN criteria for genetic testing.  However, she was unsure of some maternal family history.  She will collect some additional information and recontact the program to check for coverage.  We did discuss self-pay options for the testing if needed.  Additionally, Elisabet is scheduled for a pacemaker.  She is unsure of what her exact diagnosis is.  Her cardio records are not available at the time of the appointment.  She will speak with her cardiologist about this and may consider cardio testing if indicated.    Risk Assessment and Management:     In the absence of genetic testing, the cancer risks and guidelines reviewed are based on the  personal and/or family history provided. As guidelines continually change and the efficacy of screening for some cancers remains under investigation, Elisabet is encouraged to review personal and family history with managing physician(s) regularly.  Site of Surveillence Coordinating Provider Recommendation Status   Breast  Primary Care or Gynecology · Lifetime risk of developing breast cancer was calculated using the Tyrer-Cuzick model and is estimated to be that of the general population.  · Breast cancer risk is dynamic and can increase with age and other factors.   · Breast cancer risk assessment should be reviewed at medical visits.  · A high lifetime risk for developing breast cancer is typically 20-25% or higher.  National Comprehensive Cancer Network (NCCN) guidelines for breast cancer screening include:   Breast awareness with prompt reporting of any noticed changes to healthcare provider    Annual clinical visit including clinical breast exam and ongoing risk assessment starting at age 25  Annual mammogram beginning by age 40    Gynecologic Gynecology · Pelvic exam and pap test annually or as directed by physician.   · Review family history of uterine cancer with managing gynecologist.      Gastrointestinal PCP  Gastroenterology · Elisabet aKy is advised to continue with colorectal cancer screening as directed by physician, or sooner if symptoms or changes in history warrant sooner evaluation.   · Based on the reported family history of colorectal polyps, the patient's lifetime risk of developing colorectal cancer may be increased over that of the general population. The patient was encouraged to review this history with managing gastroenterologist to determine frequency of colonoscopy.           Skin PCP  Dermatology · Elisabet is encouraged to practice skin protective behaviors, such as:  · Annual full-body skin examination   · Use of sun protective barriers such as sunscreens, clothing, hats and UV  protective sunglasses with avoidance of mid-day sun, chronic sun exposure, and tanning beds is strongly recommended   · Awareness of ABCDEs of melanoma (asymmetry, border, color, diameter, evolution).         General Management PCP · Annual physical examination is encouraged.  · Adherence to a healthy lifestyle, including body mass index (BMI) <25 obtained through balanced diet and exercise  · Limit intake of alcoholic beverages to less than 1 drink per day (serving equals 1 ounce of liquor, 6 ounces of wine or 8 ounces of beer)  · Elisabet is advised to stop smoking. Smoking cessation counselors can be reached at [phone] ([phone]) for information and/or assistance with smoking cessation.       Plan:  Cancer risks are based on personal history, as well as on maternal and paternal family histories, mutation status, and other factors.  Relatives are encouraged to consider risk assessment and/or genetic evaluation to derive a risk-appropriate, individualized plan.  Should family member(s) be interested in genetic evaluation, the Cancer Risk Assessment and Genetics Program can provide consultation or help to find a genetic counselor in their area. Testing in relatives may assist in cancer risk assessment.    The information provided reflects current practice guidelines and may change with new medical discoveries/technology/updated personal or family history information.  Elisabet was confirmed to have understood the aforementioned information and was assisted with decision making as needed.  Informational and supportive resources were provided. Consent was obtained to share chart note(s) with physicians.  Elisabet plans to discuss the above information with physicians to determine an optimal risk management plan.    Elisabet should contact the program with personal/family history updates as this could alter the guidelines provided and/or available test options and/or to inquire about new information specific to this case.  . Elisabet was encouraged to contact the genetics program at [phone] ([phone]) with any questions or concerns and/or to periodically review test options and related insurance coverage.    A total of 60 minutes was spent providing genetic counseling to Elisabet.

## 2023-09-28 NOTE — LETTER
09/28/23    Elisabet Kay  [address] [place] [address]    Dear Ms. Kay,    Thank you for participating in the [facility] Program.  It was a pleasure working with you. Attached is documentation from our discussion(s). It will also be sent to any physicians you indicated.      You may also choose to share this documentation with your family members. Because cancer risk is based on both personal and both maternal and paternal family history factors, as well as mutation status, your relatives are recommended to review their risks and genetic testing options (if applicable) with their own healthcare providers to derive a risk-appropriate, individualized plan.      If you have any questions, concerns, or updates to your personal/family history, please contact the [facility] Risk Assessment and Genetics Program at [phone] to further review your case.    Please see below for your encounter report.    Sincerely,         Hannah Moore, [facility]        Encounter Note    Indication for Appointment:  Elisabet Kay presented for genetic counseling and cancer risk assessment at [facility] due to a family history of prostate and endometrial cancer. Elisabet was referred by Rupesh Currie MD and presented to the session alone.    Personal History:   Elisabet is 57 y.o. female of Somali, English, Yakut and Swiss descent with primary visit diagnosis of Encounter for nonprocreative genetic counseling [Z71.83].    Past Medical History:   Diagnosis Date    Arm DVT (deep venous thromboembolism), acute (CMS/HCC)     right arm      Past Medical History Pertinent Negatives:   Denies History Of: Date Noted    Disease of thyroid gland 09/28/2023    Fibroid 09/28/2023    Melanoma (CMS/HCC) 09/28/2023     Past Surgical History:   Procedure Laterality Date    Cervical cone biopsy      Colonoscopy  2020    no polyps per patient    Dilation and curettage of  "uterus      Knee surgery Right     at age 25, meniscus tear    Total abdominal hysterectomy  09/27/2012    Severe squamous dysplasia, focal adenomyosis ([id number])    [place] tooth extraction       Past Surgical History Pertinent Negatives:   Denies History Of: Date Noted    Breast biopsy 09/28/2023    Oophorectomy 09/28/2023        Height/Weight:  Height: 1.753 m (5' 9\")  Weight: 71.2 kg (157 lb) (without shoes)    Gynecologic History:  Menarche Age: 12 years  Age at first live birth: 38  Menopause Status: Post-Menopause  Age at menopause: 54  Use of hormonal contraceptives: Yes (BCPs ages 21-36 years)  Use of fertility medications: No  Use of hormone replacement therapy: No  Use of Tamoxifen/Evista: No    Social History     Tobacco Use    Smoking status: Some Days     Types: Cigarettes     Passive exposure: Past    Smokeless tobacco: Never    Tobacco comments:     5 cigarettes a week   Vaping Use    Vaping Use: Never used   Substance Use Topics    Alcohol use: Yes     Alcohol/week: 8.0 standard drinks of alcohol     Types: 8 Standard drinks or equivalent per week    Drug use: No       Family History:  See completed family history in pedigree below.              Genetic Education/Risk Assessment/Counseling:  Information was provided about the relationship between genes and cancer.  The concept of hereditary cancer was defined.  Natural history, risks and inheritance patterns of  cancer-associated genes were reviewed, as related to Elisabets personal and/or family history.  Related psychosocial aspects were discussed.    Discussion of Genetic Testing:  The pros, cons, and limitations of testing for genetic susceptibility were discussed, including but not limited to test options, possible results, potential impact on management, and psychosocial aspects.  There may be limited data on the degree of cancer risk and/or no defined management guidelines associated with some genes.  If applicable, risk assessment " models and/or published tables were used to provide a mutation probability estimate. Limitations of assessment were reviewed.    Given the reported personal and/or family history, genetic testing was offered but declined at this time. With the reported family history of cancer, Elisabet does not meet NCCN criteria for genetic testing.  However, she was unsure of some maternal family history.  She will collect some additional information and recontact the program to check for coverage.  We did discuss self-pay options for the testing if needed.  Additionally, Elisabet is scheduled for a pacemaker.  She is unsure of what her exact diagnosis is.  Her cardio records are not available at the time of the appointment.  She will speak with her cardiologist about this and may consider cardio testing if indicated.    Risk Assessment and Management:    In the absence of genetic testing, the cancer risks and guidelines reviewed are based on the personal and/or family history provided. As guidelines continually change and the efficacy of screening for some cancers remains under investigation, Elisabet is encouraged to review personal and family history with managing physician(s) regularly.  Site of Surveillence Coordinating Provider Recommendation Status   Breast  Primary Care or Gynecology · Lifetime risk of developing breast cancer was calculated using the Tyrer-Cuzick model and is estimated to be that of the general population.  · Breast cancer risk is dynamic and can increase with age and other factors.   · Breast cancer risk assessment should be reviewed at medical visits.  · A high lifetime risk for developing breast cancer is typically 20-25% or higher.  National Comprehensive Cancer Network (NCCN) guidelines for breast cancer screening include:   Breast awareness with prompt reporting of any noticed changes to healthcare provider    Annual clinical visit including clinical breast exam and ongoing risk assessment starting at age  25  Annual mammogram beginning by age 40    Gynecologic Gynecology · Pelvic exam and pap test annually or as directed by physician.   · Review family history of uterine cancer with managing gynecologist.      Gastrointestinal PCP  Gastroenterology · Elisabet Kay is advised to continue with colorectal cancer screening as directed by physician, or sooner if symptoms or changes in history warrant sooner evaluation.   · Based on the reported family history of colorectal polyps, the patient's lifetime risk of developing colorectal cancer may be increased over that of the general population. The patient was encouraged to review this history with managing gastroenterologist to determine frequency of colonoscopy.           Skin PCP  Dermatology · Elisabet is encouraged to practice skin protective behaviors, such as:  · Annual full-body skin examination   · Use of sun protective barriers such as sunscreens, clothing, hats and UV protective sunglasses with avoidance of mid-day sun, chronic sun exposure, and tanning beds is strongly recommended   · Awareness of ABCDEs of melanoma (asymmetry, border, color, diameter, evolution).        General Management PCP · Annual physical examination is encouraged.  · Adherence to a healthy lifestyle, including body mass index (BMI) <25 obtained through balanced diet and exercise  · Limit intake of alcoholic beverages to less than 1 drink per day (serving equals 1 ounce of liquor, 6 ounces of wine or 8 ounces of beer)  · Elisabet is advised to stop smoking. Smoking cessation counselors can be reached at [phone] ([phone]) for information and/or assistance with smoking cessation.       Plan:  Cancer risks are based on personal history, as well as on maternal and paternal family histories, mutation status, and other factors.  Relatives are encouraged to consider risk assessment and/or genetic evaluation to derive a risk-appropriate, individualized plan.  Should family member(s) be  interested in genetic evaluation, the Cancer Risk Assessment and Genetics Program can provide consultation or help to find a genetic counselor in their area. Testing in relatives may assist in cancer risk assessment.    The information provided reflects current practice guidelines and may change with new medical discoveries/technology/updated personal or family history information.  Elisabet was confirmed to have understood the aforementioned information and was assisted with decision making as needed.  Informational and supportive resources were provided. Consent was obtained to share chart note(s) with physicians.  Elisabet plans to discuss the above information with physicians to determine an optimal risk management plan.    Elisabet should contact the program with personal/family history updates as this could alter the guidelines provided and/or available test options and/or to inquire about new information specific to this case. Elisabet was encouraged to contact the genetics program at [phone] ([phone] with any questions or concerns and/or to periodically review test options and related insurance coverage.    A total of 60 minutes was spent providing genetic counseling to Elisabet.

## 2023-10-05 NOTE — H&P
"Electrophysiology History & Physical Adjunct     Date of Service: 10/10/23     Elisabet Kay is a pleasant 57 year old female patient of Dr. Syed Gregg presenting to the [facility] for a dual-chamber left bundle pacemaker implantation procedure. The patient was last seen in the office by Dr. Mo Ramirez on 09/13/23. The chart/records were reviewed, the patient was interviewed and examined.  The procedure technique was re-reviewed with additional questions answered to their verbal understanding.  Risks, alternatives, and potential therapeutic benefits were discussed.  Patient is NOT noted to be taking systemic anticoagulation or antiplatelet therapy. Patient has been NPO since midnight. Admits to fatigue and lightheadedness.  Patient denies any chest pain/pressure, palpitations, shortness of breath at rest, dyspnea on exertion, paroxysmal nocturnal dyspnea, orthopnea, syncope, claudication, edema. Patient also denies any fevers/chills, headache, abdominal pain, nausea/vomiting, diarrhea.     Consent form was reviewed, patient executed document verbalizing understanding and wish to proceed.     I have reviewed and agree with the most recent H&P.  There have been no pertinent changes to this patient's history since they were last seen in the office. The most recent laboratory values and today's physical exam are noted below.     Vitals:   Visit Vitals  /72   Pulse 62   Temp 36.4 °C (97.6 °F) (Temporal)   Resp 19   Ht 1.753 m (5' 9\")   Wt 71.7 kg (158 lb)   SpO2 98%   BMI 23.33 kg/m²       Physical Exam:  Physical Exam  Vitals reviewed.   Constitutional:       Appearance: Normal appearance. She is normal weight.   HENT:      Head: Normocephalic and atraumatic.      Right Ear: External ear normal.      Left Ear: External ear normal.      Nose: Nose normal.      Mouth/Throat:      Mouth: Mucous membranes are moist.      Pharynx: Oropharynx is clear.   Eyes:      General: No " scleral icterus.     Conjunctiva/sclera: Conjunctivae normal.      Pupils: Pupils are equal, round, and reactive to light.   Cardiovascular:      Rate and Rhythm: Normal rate and regular rhythm.      Pulses: Normal pulses.      Heart sounds: Normal heart sounds.      Comments: Sinus Rhythm on telemetry.  Pulmonary:      Effort: Pulmonary effort is normal.      Breath sounds: Normal breath sounds.   Abdominal:      General: Abdomen is flat. Bowel sounds are normal.      Palpations: Abdomen is soft.   Musculoskeletal:         General: Normal range of motion.      Cervical back: Normal range of motion and neck supple.      Right lower leg: No edema.      Left lower leg: No edema.   Skin:     General: Skin is warm and dry.   Neurological:      General: No focal deficit present.      Mental Status: She is alert and oriented to person, place, and time. Mental status is at baseline.   Psychiatric:         Mood and Affect: Mood normal.         Behavior: Behavior normal.         Thought Content: Thought content normal.         Judgment: Judgment normal.           Labs:   Latest Reference Range & Units 10/06/23 15:48   Sodium 136 - 145 mEQ/L 140   Potassium 3.5 - 5.1 mEQ/L 4.2   Chloride 98 - 107 mEQ/L 106   CO2 21 - 31 mEQ/L 28   BUN 7 - 25 mg/dL 15   Creatinine 0.6 - 1.2 mg/dL 0.8   Glucose 70 - 99 mg/dL 93   Calcium 8.6 - 10.3 mg/dL 9.6   AST (SGOT) 13 - 39 IU/L 22   ALT (SGPT) 7 - 52 IU/L 20   Alkaline Phosphatase 34 - 125 IU/L 49   Total Protein 6.0 - 8.2 g/dL 6.8   Albumin 3.5 - 5.7 g/dL 4.5   Bilirubin, Total 0.3 - 1.2 mg/dL 0.5   eGFR >=60.0 mL/min/1.73m*2 >60.0   Anion Gap 3 - 15 mEQ/L 6   Magnesium 1.8 - 2.5 mg/dL 1.8   WBC 3.80 - 10.50 K/uL 5.58   RBC 3.93 - 5.22 M/uL 4.26   Hemoglobin 11.8 - 15.7 g/dL 13.5   Hematocrit 35.0 - 45.0 % 40.2   MCV 83.0 - 98.0 fL 94.4   MCH 28.0 - 33.2 pg 31.7   MCHC 32.2 - 35.5 g/dL 33.6   RDW 11.7 - 14.4 % 11.9   Platelets 150 - 369 K/uL 308   MPV 9.4 - 12.3 fL 9.5   Differential  Type  Auto   nRBC <=0.0 % 0.0   Immature Granulocytes % 0.2   Neutrophils % 45.4   Lymphocytes % 43.5   Monocytes % 8.2   Eosinophils % 2.0   Basophils % 0.7   Immature Granulocytes, Absolute 0.00 - 0.08 K/uL 0.01   Neutrophils, Absolute 1.70 - 7.00 K/uL 2.53   Lymphocytes, Absolute 1.20 - 3.50 K/uL 2.43   Monocytes, Absolute 0.28 - 0.80 K/uL 0.46   Eosinophils, Absolute 0.04 - 0.36 K/uL 0.11   Basophils, Absolute 0.01 - 0.10 K/uL 0.04   Protime 12.2 - 14.5 sec 13.1   INR -  1.0   Specimen Expiration  10/13/2023   ABO Grouping  A   Rh Factor  Positive   Antibody Screen  Negative   History Check  Previous type on file   Blood Bank Comment  Pt denies preg, tx or miscarriage w/in past 3 mos         FROM OFFICE VISIT WITH DR. [place] ON 09/14/23:  Date of service: 09/13/2023 2:30 PM    Background     Reason for Visit: Consultation    History from: Patient    Chief Complaint   #1: See HPI    History of Present Illness:  Mrs Kay Is a very pleasant 57-year-old female patient of Dr. Gregg  who has a past medical history that is significant for mitral valve prolapse and  active tobacco use who was seen in cardiology consultation in regards to documented lower heart rates noted on pulse oximeter.  Her cardiologist then ordered a mobile cardiac outpatient telemetry monitor which showed not only sinus rhythm and sinus bradycardia but moderate sinus pauses consistent with subclinical sinus node dysfunction.  She has not noticed episodes of syncope but does have periods of lightheadedness that are unexplained.    she additionally complains of increasing fatigue over the last 6-9 months.As such, she was referred to electrophysiology today in consultation to discuss possible pacemaker implantation. 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  She does not participate in a dedicated or regular exercise program.   She denies palpitations, chest pain,  shortness of breath, orthopnea, claudication, or edema.  History of Present Illness (cont.):    MCOT 8/2023:  1. Monitoring period: The monitoring period was from 8/17/2023 to 8/23/2023. The total time analyzed was 6 days 21 min. 2. Baseline rhythm was Sinus Rhythm. The average HR was 67 bpm, minimum HR was 51 bpm, and maximum HR was 106 bpm. 3. There were 3 sinus pause events greater than 3 secs with the longest duration being 4.3 seconds. No AV block noted. Pauses occurred during waking hours. 4. Atrial arrhythmia: There were 5741 PACs with a PAC burden of 1.02%. Junctional rhythm is noted. 5. Ventricular arrhythmia: No ventricular arrhythmia was detected. 6. Symptoms: 0 patient symptom activations were noted.    Echo 8/2023:  EF 65%.   Grade 1 left ventricular diastolic dysfunction is present.   No significant valvular or pericardial abnormalities.    Past Medical and Surgical Histories   Past Medical History (reviewed - no changes required):   Hypothyroidism  Sinus node dysfunction  Fatigue  Near-syncope / dizziness  Mitral valve prolapse    Social History     Smoked Tobacco Usage    Smoking Status:  Former    Detailed Status:  Former smoker      She smokes.      Smokeless Tobacco Usage     Smokeless Status:   Never    Alcohol Usage     Alcohol Status:  Current    She drinks one wine/day.    Average drink(s) / day: One    Mostly:   Wine    Family History   Structured Family History  [01]  Father:  FH Hypertension;  (8/3/2023).  [01]  Father:  FH Cancer;  (8/3/2023).  [01]  Mother:  FH Heart disease;  (8/3/2023).  [01]  Mother:  FH Congenital heart disease;  (8/3/2023).  [01]  Mother:  FH Cancer;  (8/3/2023).    Review of Systems   General: Acknowledges fatigue and malaise. Denies weakness.   Eyes: Denies change in vision.   Cardiovascular: Denies chest pain at rest, chest pain with exercise, palpitations, peripheral edema and dyspnea on exertion.   Respiratory: Denies dyspnea at rest, wheezing and shortness of  breath.   Gastrointestinal: Denies nausea, vomiting, change in bowel habits and blood in stool.   Genitourinary: Denies hematuria and nocturia.   Musculoskeletal: Denies myalgias.   Neurologic: Acknowledges pre-syncope and dizziness. Denies syncope.   Psychiatric: Denies depression and anxiety.   Endocrine: Denies unusual weight change.   Heme/Lymphatic: Denies abnormal bruising and bleeding.   Allergic/Immunologic: Denies seasonal allergies.     Physical Exam  Vital Signs:     Height: 69 inches  (09/13/2023).    Weight: 158 pounds    BP: 128/76 mmHg.    Body Mass Index: 23.42    Body Surface Area: 1.87 m2.  General: Well developed and well nourished.   Eyes: HECTOR, conjunctiva and sclera clear and no xanthomas.   Neck: Supple, no adenopathy and no thyromegaly.   Lungs: Clear to auscultation.   Chest: No obvious deformity.   Heart: Regular rate and rhythm, S1 and S2 physiologic and PMI not displaced. Examination of neck veins reveals no neck vein distention.    Extremities: There is no peripheral edema cyanosis or clubbing.   Pulses: Pedal pulses are normal bilaterally and radial pulses are normal bilaterally.   Abdomen: Normoactive bowel sounds and soft and non-tender.   Musculoskeletal: Normal gait and normal strength and tone.   Skin: Warm and dry and no rashes.   Neurologic: Alert, oriented x 3 and appropriate affect.        Heart Rate: 76 bpm. NV:  144 msec. QRS: 80 msec. QT: 386 msec.      Medications:   lorazepam 1 mg tablet (lorazepam) Take 1/2 tablet by mouth once a day   Concerta 54 mg tablet extended release 24hr (methylphenidate hcl) Take 1 tablet by mouth once a day   bupropion HCl 150 mg tablet extended release 24 hr (bupropion hcl) Take 1 tablet by mouth once a day   Synthroid 100 mcg tablet (levothyroxine) Take 1 tablet by mouth once a day   Medications reviewed today.      Allergies, Intolerances and/or Therapeutic Variances:    No known allergies and adverse reactions set during this  update.  Allergies and adverse reactions reviewed today.      Problems:   HX OF SICK SINUS SYNDROME (SSS) (ICD-V12.59) (WCJ26-S42.79)  MITRAL VALVE PROLAPSE (ICD-424.0) (BSL27-M59.1)  BRADYCARDIA (ICD-427.89) (YEI40-H84.1)  SUPRAVENTRICULAR PREMATURE BEATS (ICD-427.61) (AIW84-E59.1)  ABNORMAL EKG (ICD-794.31) (QHJ31-D83.31)  DIZZINESS (ICD-780.4) (LLD75-A90)  Problems reviewed today.      Impression:  1)  Hx Of Sick Sinus Syndrome (sss) :   Sinus node dysfunction and symptoms that could easily be explained by this conditions such as increasing fatigue and malaise as well as episodes of dizziness / near syncope.  She is likely not high knees true syncopal events.  Her sinus pauses noted on mobile cardiac outpatient telemetry monitor occurred during waking hours.  At a minimum, we should avoid worsening this with any medications such as beta-blockers calcium channel blockers or antiarrhythmic.  I do think however we could likely both prevent syncope and improve her fatigue with implantation of a dual chamber pacemaker with predominant atrial pacing.  This does however, with risks which were discussed today.  Risks, benefits, and alternatives to this procedure were discussed extensively with the patient including, but not limited to anesthesia complications, lack of success, bleeding and hematoma at the device site, local or bloodstream infection, pneumothorax possibly requiring chest tube for drainage, induction of life-threatening arrhythmias, death, congestive heart failure, cardiac perforation/pericardial effusion / cardiac tamponade requiring either drainage or surgery, lead dislodgement requiring reoperation, mi, stroke, and other unforeseen complications.  I have given the patient the tear decision making website for review. We could alternatively implantable loop recorder for close monitoring over the long-term.    2)  Mitral Valve Prolapse : Recent echo performed.     3)  Supraventricular Premature Beats :    Slow heart rates were thought to be due to nonconducted PACs or non physiologic PVCs.  Turns out this is more likely related to subclinical sinus node dysfunction.    4)  Abnormal EKG :   Sinus bradycaria.   5)  Dizziness : Unclear if this is related to her brief pauses noted on her recent mobile cardiac outpatient telemetry monitor as these did not correlate with symptomatic events.    6) Hyperlipidemia: Cholesterol 206. .         [facility] recommends that Ms. Elisabet Kay follow-up with their primary care physician for evaluation and management of other medical conditions. If the patient does not have a primary care provider, our office is willing to help the patient find a primary provider.        Return visit requested: 4 month(s) as a preferred in office encounter.    Medical Decision Making: Order Unique Test                  Electronically Signed by Mo Ramirez M.D. on 09/13/2023 at 3:04 PM    ________________________________________________________________________  Date of service: 09/13/2023 8:49 AM      Past Medical and Surgical Histories   Past Medical History:   Hypothyroidism    Social History     Smoked Tobacco Usage    Smoking Status:  Former    Detailed Status:  Former smoker      She smokes.      Smokeless Tobacco Usage     Smokeless Status:   Never    Alcohol Usage     Alcohol Status:  Current    She drinks one wine/day.    Average drink(s) / day: One    Mostly:   Wine    Family History   Structured Family History  [01]  Father:   Hypertension;  (8/3/2023).  [01]  Father:  FH Cancer;  (8/3/2023).  [01]  Mother:   Heart disease;  (8/3/2023).  [01]  Mother:   Congenital heart disease;  (8/3/2023).  [01]  Mother:  FH Cancer;  (8/3/2023).      Physical Exam  Vital Signs:     Height: 69 inches  (09/13/2023).        Medications:   lorazepam 1 mg tablet (lorazepam) Take 1/2 tablet by mouth once a day   Concerta 54 mg tablet extended release 24hr  (methylphenidate hcl) Take 1 tablet by mouth once a day   bupropion HCl 150 mg tablet extended release 24 hr (bupropion hcl) Take 1 tablet by mouth once a day   Synthroid 100 mcg tablet (levothyroxine) Take 1 tablet by mouth once a day   Medications last reviewed on 09/13/2023.      Allergies, Intolerances and/or Therapeutic Variances:    No known allergies and adverse reactions set on 09/13/2023.  Allergies and adverse reactions last reviewed on 09/13/2023.      Problems:   HX OF SICK SINUS SYNDROME (SSS) (ICD-V12.59) (FAS43-B07.79)  MITRAL VALVE PROLAPSE (ICD-424.0) (NXL77-G12.1)  BRADYCARDIA (ICD-427.89) (GMD15-G35.1)  SUPRAVENTRICULAR PREMATURE BEATS (ICD-427.61) (TUN37-C02.1)  ABNORMAL EKG (ICD-794.31) (PXN68-S88.31)  DIZZINESS (ICD-780.4) (WQA89-S86)  Problems last reviewed on 09/13/2023.      [facility] recommends that Ms. Elisabet Kay follow-up with their primary care physician for evaluation and management of other medical conditions. If the patient does not have a primary care provider, our office is willing to help the patient find a primary provider.        Return visit requested: 4 month(s) as a preferred in office encounter.                Electronically Signed by Mo Ramirez M.D. on 09/14/2023 at 12:58 PM    ________________________________________________________________________

## 2023-10-06 ENCOUNTER — APPOINTMENT (OUTPATIENT)
Dept: LAB | Facility: HOSPITAL | Age: 58
End: 2023-10-06
Attending: INTERNAL MEDICINE
Payer: COMMERCIAL

## 2023-10-06 ENCOUNTER — TRANSCRIBE ORDERS (OUTPATIENT)
Dept: LAB | Facility: HOSPITAL | Age: 58
End: 2023-10-06

## 2023-10-06 DIAGNOSIS — R94.31 ABNORMAL ELECTROCARDIOGRAM (ECG) (EKG): Primary | ICD-10-CM

## 2023-10-06 DIAGNOSIS — R94.31 ABNORMAL ELECTROCARDIOGRAM (ECG) (EKG): ICD-10-CM

## 2023-10-06 LAB
ABO + RH BLD: NORMAL
ALBUMIN SERPL-MCNC: 4.5 G/DL (ref 3.5–5.7)
ALP SERPL-CCNC: 49 IU/L (ref 34–125)
ALT SERPL-CCNC: 20 IU/L (ref 7–52)
ANION GAP SERPL CALC-SCNC: 6 MEQ/L (ref 3–15)
AST SERPL-CCNC: 22 IU/L (ref 13–39)
BASOPHILS # BLD: 0.04 K/UL (ref 0.01–0.1)
BASOPHILS NFR BLD: 0.7 %
BILIRUB SERPL-MCNC: 0.5 MG/DL (ref 0.3–1.2)
BLD GP AB SCN SERPL QL: NEGATIVE
BLOOD BANK CMNT PATIENT-IMP: NORMAL
BUN SERPL-MCNC: 15 MG/DL (ref 7–25)
CALCIUM SERPL-MCNC: 9.6 MG/DL (ref 8.6–10.3)
CHLORIDE SERPL-SCNC: 106 MEQ/L (ref 98–107)
CO2 SERPL-SCNC: 28 MEQ/L (ref 21–31)
CREAT SERPL-MCNC: 0.8 MG/DL (ref 0.6–1.2)
D AG BLD QL: POSITIVE
DIFFERENTIAL METHOD BLD: NORMAL
EOSINOPHIL # BLD: 0.11 K/UL (ref 0.04–0.36)
EOSINOPHIL NFR BLD: 2 %
ERYTHROCYTE [DISTWIDTH] IN BLOOD BY AUTOMATED COUNT: 11.9 % (ref 11.7–14.4)
GFR SERPL CREATININE-BSD FRML MDRD: >60 ML/MIN/1.73M*2
GLUCOSE SERPL-MCNC: 93 MG/DL (ref 70–99)
HCT VFR BLDCO AUTO: 40.2 % (ref 35–45)
HGB BLD-MCNC: 13.5 G/DL (ref 11.8–15.7)
IMM GRANULOCYTES # BLD AUTO: 0.01 K/UL (ref 0–0.08)
IMM GRANULOCYTES NFR BLD AUTO: 0.2 %
INR PPP: 1
LABORATORY COMMENT REPORT: NORMAL
LYMPHOCYTES # BLD: 2.43 K/UL (ref 1.2–3.5)
LYMPHOCYTES NFR BLD: 43.5 %
MCH RBC QN AUTO: 31.7 PG (ref 28–33.2)
MCHC RBC AUTO-ENTMCNC: 33.6 G/DL (ref 32.2–35.5)
MCV RBC AUTO: 94.4 FL (ref 83–98)
MONOCYTES # BLD: 0.46 K/UL (ref 0.28–0.8)
MONOCYTES NFR BLD: 8.2 %
NEUTROPHILS # BLD: 2.53 K/UL (ref 1.7–7)
NEUTS SEG NFR BLD: 45.4 %
NRBC BLD-RTO: 0 %
PDW BLD AUTO: 9.5 FL (ref 9.4–12.3)
PLATELET # BLD AUTO: 308 K/UL (ref 150–369)
POTASSIUM SERPL-SCNC: 4.2 MEQ/L (ref 3.5–5.1)
PROT SERPL-MCNC: 6.8 G/DL (ref 6–8.2)
PROTHROMBIN TIME: 13.1 SEC (ref 12.2–14.5)
RBC # BLD AUTO: 4.26 M/UL (ref 3.93–5.22)
SODIUM SERPL-SCNC: 140 MEQ/L (ref 136–145)
SPECIMEN EXP DATE BLD: NORMAL
WBC # BLD AUTO: 5.58 K/UL (ref 3.8–10.5)

## 2023-10-06 PROCEDURE — 80053 COMPREHEN METABOLIC PANEL: CPT

## 2023-10-06 PROCEDURE — 85025 COMPLETE CBC W/AUTO DIFF WBC: CPT

## 2023-10-06 PROCEDURE — 85610 PROTHROMBIN TIME: CPT

## 2023-10-06 PROCEDURE — 36415 COLL VENOUS BLD VENIPUNCTURE: CPT

## 2023-10-06 PROCEDURE — 83735 ASSAY OF MAGNESIUM: CPT

## 2023-10-06 PROCEDURE — 86901 BLOOD TYPING SEROLOGIC RH(D): CPT

## 2023-10-07 LAB — MAGNESIUM SERPL-MCNC: 1.8 MG/DL (ref 1.8–2.5)

## 2023-10-10 ENCOUNTER — ANESTHESIA (OUTPATIENT)
Dept: CARDIOLOGY | Facility: HOSPITAL | Age: 58
Setting detail: HOSPITAL OUTPATIENT SURGERY
End: 2023-10-10
Payer: COMMERCIAL

## 2023-10-10 ENCOUNTER — APPOINTMENT (OUTPATIENT)
Dept: RADIOLOGY | Facility: HOSPITAL | Age: 58
Setting detail: HOSPITAL OUTPATIENT SURGERY
End: 2023-10-10
Attending: PHYSICIAN ASSISTANT
Payer: COMMERCIAL

## 2023-10-10 ENCOUNTER — HOSPITAL ENCOUNTER (OUTPATIENT)
Facility: HOSPITAL | Age: 58
Setting detail: HOSPITAL OUTPATIENT SURGERY
Discharge: HOME | End: 2023-10-10
Attending: INTERNAL MEDICINE | Admitting: INTERNAL MEDICINE
Payer: COMMERCIAL

## 2023-10-10 VITALS
RESPIRATION RATE: 14 BRPM | SYSTOLIC BLOOD PRESSURE: 147 MMHG | BODY MASS INDEX: 23.4 KG/M2 | OXYGEN SATURATION: 94 % | HEART RATE: 65 BPM | TEMPERATURE: 97.6 F | HEIGHT: 69 IN | DIASTOLIC BLOOD PRESSURE: 62 MMHG | WEIGHT: 158 LBS

## 2023-10-10 DIAGNOSIS — R94.31 ABNORMAL EKG: ICD-10-CM

## 2023-10-10 DIAGNOSIS — I49.5 SINUS NODE DYSFUNCTION (CMS/HCC): Primary | ICD-10-CM

## 2023-10-10 LAB
ATRIAL RATE: 62
P AXIS: 20
PR INTERVAL: 152
QRS DURATION: 86
QT INTERVAL: 458
QTC CALCULATION(BAZETT): 464
R AXIS: 57
T WAVE AXIS: 64
VENTRICULAR RATE: 62

## 2023-10-10 PROCEDURE — 0JH606Z INSERTION OF PACEMAKER, DUAL CHAMBER INTO CHEST SUBCUTANEOUS TISSUE AND FASCIA, OPEN APPROACH: ICD-10-PCS | Performed by: INTERNAL MEDICINE

## 2023-10-10 PROCEDURE — 71000011 HC PACU PHASE 1 EA ADDL MIN: Performed by: INTERNAL MEDICINE

## 2023-10-10 PROCEDURE — 71045 X-RAY EXAM CHEST 1 VIEW: CPT

## 2023-10-10 PROCEDURE — C1893 INTRO/SHEATH, FIXED,NON-PEEL: HCPCS | Performed by: INTERNAL MEDICINE

## 2023-10-10 PROCEDURE — 63600000 HC DRUGS/DETAIL CODE: Performed by: INTERNAL MEDICINE

## 2023-10-10 PROCEDURE — 02H63JZ INSERTION OF PACEMAKER LEAD INTO RIGHT ATRIUM, PERCUTANEOUS APPROACH: ICD-10-PCS | Performed by: INTERNAL MEDICINE

## 2023-10-10 PROCEDURE — C1894 INTRO/SHEATH, NON-LASER: HCPCS | Performed by: INTERNAL MEDICINE

## 2023-10-10 PROCEDURE — 02HK3JZ INSERTION OF PACEMAKER LEAD INTO RIGHT VENTRICLE, PERCUTANEOUS APPROACH: ICD-10-PCS | Performed by: INTERNAL MEDICINE

## 2023-10-10 PROCEDURE — C1785 PMKR, DUAL, RATE-RESP: HCPCS | Performed by: INTERNAL MEDICINE

## 2023-10-10 PROCEDURE — 63600000 HC DRUGS/DETAIL CODE: Mod: JW | Performed by: ANESTHESIOLOGY

## 2023-10-10 PROCEDURE — 25000000 HC PHARMACY GENERAL: Performed by: INTERNAL MEDICINE

## 2023-10-10 PROCEDURE — 71000001 HC PACU PHASE 1 INITIAL 30MIN: Performed by: INTERNAL MEDICINE

## 2023-10-10 PROCEDURE — 33208 INSRT HEART PM ATRIAL & VENT: CPT | Mod: KX | Performed by: INTERNAL MEDICINE

## 2023-10-10 PROCEDURE — 93005 ELECTROCARDIOGRAM TRACING: CPT | Performed by: PHYSICIAN ASSISTANT

## 2023-10-10 PROCEDURE — 25800000 HC PHARMACY IV SOLUTIONS: Performed by: ANESTHESIOLOGY

## 2023-10-10 PROCEDURE — C1898 LEAD, PMKR, OTHER THAN TRANS: HCPCS | Performed by: INTERNAL MEDICINE

## 2023-10-10 PROCEDURE — 37000002 HC ANESTHESIA MAC: Performed by: INTERNAL MEDICINE

## 2023-10-10 PROCEDURE — 27200000 HC STERILE SUPPLY: Performed by: INTERNAL MEDICINE

## 2023-10-10 PROCEDURE — 93010 ELECTROCARDIOGRAM REPORT: CPT | Performed by: INTERNAL MEDICINE

## 2023-10-10 DEVICE — IMPLANTABLE DEVICE
Type: IMPLANTABLE DEVICE | Site: CHEST | Status: FUNCTIONAL
Brand: SOLIA S 60

## 2023-10-10 DEVICE — IMPLANTABLE DEVICE
Type: IMPLANTABLE DEVICE | Site: CHEST | Status: FUNCTIONAL
Brand: SOLIA S 53

## 2023-10-10 DEVICE — IMPLANTABLE DEVICE
Type: IMPLANTABLE DEVICE | Site: CHEST | Status: FUNCTIONAL
Brand: EDORA 8 DR-T

## 2023-10-10 RX ORDER — NEOMYCIN AND POLYMYXIN B SULFATES 40; 200000 MG/ML; [USP'U]/ML
SOLUTION IRRIGATION
Status: DISCONTINUED | OUTPATIENT
Start: 2023-10-10 | End: 2023-10-10 | Stop reason: HOSPADM

## 2023-10-10 RX ORDER — CEFAZOLIN SODIUM 2 G/100ML
INJECTION, SOLUTION INTRAVENOUS AS NEEDED
Status: DISCONTINUED | OUTPATIENT
Start: 2023-10-10 | End: 2023-10-10 | Stop reason: SURG

## 2023-10-10 RX ORDER — LIDOCAINE HYDROCHLORIDE 10 MG/ML
INJECTION, SOLUTION INFILTRATION; PERINEURAL
Status: DISCONTINUED | OUTPATIENT
Start: 2023-10-10 | End: 2023-10-10 | Stop reason: HOSPADM

## 2023-10-10 RX ORDER — BUPIVACAINE HYDROCHLORIDE 5 MG/ML
INJECTION, SOLUTION PERINEURAL
Status: DISCONTINUED | OUTPATIENT
Start: 2023-10-10 | End: 2023-10-10 | Stop reason: HOSPADM

## 2023-10-10 RX ORDER — ACETAMINOPHEN 325 MG/1
650 TABLET ORAL EVERY 4 HOURS PRN
Status: DISCONTINUED | OUTPATIENT
Start: 2023-10-10 | End: 2023-10-10 | Stop reason: HOSPADM

## 2023-10-10 RX ORDER — FENTANYL CITRATE 50 UG/ML
INJECTION, SOLUTION INTRAMUSCULAR; INTRAVENOUS AS NEEDED
Status: DISCONTINUED | OUTPATIENT
Start: 2023-10-10 | End: 2023-10-10 | Stop reason: SURG

## 2023-10-10 RX ORDER — ONDANSETRON HYDROCHLORIDE 2 MG/ML
INJECTION, SOLUTION INTRAVENOUS AS NEEDED
Status: DISCONTINUED | OUTPATIENT
Start: 2023-10-10 | End: 2023-10-10 | Stop reason: SURG

## 2023-10-10 RX ORDER — PROPOFOL 10 MG/ML
INJECTION, EMULSION INTRAVENOUS CONTINUOUS PRN
Status: DISCONTINUED | OUTPATIENT
Start: 2023-10-10 | End: 2023-10-10 | Stop reason: SURG

## 2023-10-10 RX ORDER — MIDAZOLAM HYDROCHLORIDE 2 MG/2ML
INJECTION, SOLUTION INTRAMUSCULAR; INTRAVENOUS AS NEEDED
Status: DISCONTINUED | OUTPATIENT
Start: 2023-10-10 | End: 2023-10-10 | Stop reason: SURG

## 2023-10-10 RX ADMIN — PROPOFOL 50 MCG/KG/MIN: 10 INJECTION, EMULSION INTRAVENOUS at 11:35

## 2023-10-10 RX ADMIN — FENTANYL CITRATE 100 MCG: 50 INJECTION, SOLUTION INTRAMUSCULAR; INTRAVENOUS at 11:27

## 2023-10-10 RX ADMIN — ONDANSETRON 4 MG: 2 INJECTION INTRAMUSCULAR; INTRAVENOUS at 12:29

## 2023-10-10 RX ADMIN — CEFAZOLIN SODIUM 2 G: 2 INJECTION, SOLUTION INTRAVENOUS at 11:24

## 2023-10-10 RX ADMIN — MIDAZOLAM HYDROCHLORIDE 2 MG: 1 INJECTION, SOLUTION INTRAMUSCULAR; INTRAVENOUS at 11:23

## 2023-10-10 RX ADMIN — SODIUM CHLORIDE: 9 INJECTION, SOLUTION INTRAVENOUS at 11:22

## 2023-10-10 NOTE — Clinical Note
The PACEMAKER BIPIN JEROME/ DONY  - [id number] - [id number] device was inserted. The leads were placed into the connector and visually verified to be in correct position. Injury current obtained.

## 2023-10-10 NOTE — ANESTHESIA PREPROCEDURE EVALUATION
Relevant Problems   No relevant active problems       ROS/Med Hx     Past Surgical History:   Procedure Laterality Date    CERVICAL CONE BIOPSY      COLONOSCOPY  2020    no polyps per patient    DILATION AND CURETTAGE OF UTERUS      KNEE SURGERY Right     at age 25, meniscus tear    TOTAL ABDOMINAL HYSTERECTOMY  09/27/2012    Severe squamous dysplasia, focal adenomyosis ([id number])    WISDOM TOOTH EXTRACTION         Physical Exam    Airway   Mallampati: II   TM distance: >3 FB   Neck ROM: full      There is no problem list on file for this patient.      Past Surgical History:   Procedure Laterality Date    CERVICAL CONE BIOPSY      COLONOSCOPY  2020    no polyps per patient    DILATION AND CURETTAGE OF UTERUS      KNEE SURGERY Right     at age 25, meniscus tear    TOTAL ABDOMINAL HYSTERECTOMY  09/27/2012    Severe squamous dysplasia, focal adenomyosis ([id number])    WISDOM TOOTH EXTRACTION         No current facility-administered medications for this encounter.       Prior to Admission medications    Medication Sig Start Date End Date Taking? Authorizing Provider   buPROPion XL (WELLBUTRIN XL) 150 mg 24 hr tablet  6/26/23  Yes ProviderAlem MD   cholecalciferol, vitamin D3, 1,000 unit (25 mcg) tablet Take 1,000 Units by mouth daily.   Yes ProviderAlem MD   CONCERTA 54 mg ER tablet Take 54 mg by mouth daily. 6/15/23  Yes Alem Mena MD   LORazepam (ATIVAN) 1 mg tablet Take 1 tablet (1 mg total) by mouth nightly as needed for sleep for up to 2 days. 2/6/21 10/10/23 Yes Priscilla Gottlieb MD   METHYLPHENIDATE HCL (CONCERTA ORAL) Take 10 mg by mouth once. Takes lower does when she's not eating.   Yes ProviderIsabel MD   levothyroxine (SYNTHROID) 100 mcg tablet Take 100 mcg by mouth.    ProviderIsabel MD   sertraline (ZOLOFT) 25 mg tablet Take 1 tablet (25 mg total) by mouth daily. 7/12/22 8/24/23  Rupesh Currie MD   estradioL (DivigeL) 1 mg/gram (0.1  "%) gel in packet Place on the skin daily. 6/29/23 10/10/23  Rupesh Currie MD   tretinoin (RETIN-A) 0.025 % cream apply 1 APPLICATION ON SKIN nightly 5/26/23 10/10/23  Provider, Alem Hall MD       CBC Results       10/06/23 05/22/19 04/05/16     1548 0837     WBC 5.58 4.9 6.4    RBC 4.26 4.18 3.82    HGB 13.5 13.0 12.1    HCT 40.2 38.3 35.5    MCV 94.4 92 93    MCH 31.7 31.1 31.7    MCHC 33.6 33.9 34.1     290 258         Comment for PLT at 0837 on 05/22/19:               **Please note reference interval change**          BMP Results       10/06/23 05/22/19 04/05/16     1548 0837      140 138    K 4.2 4.2 4.2    Cl 106 104 102    CO2 28 23 21    Glucose 93 104 85    BUN 15 11 14    Creatinine 0.8 0.84 0.72    Calcium 9.6 -- --    Anion Gap 6 -- --    EGFR >60.0 80 98      92 113          No results found for: \"HCGPREGUR\", \"PREGSERUM\", \"HCG\", \"HCGQUANT\"    Results from last 7 days   Lab Units 10/06/23  1548   INR  1.0       No orders to display       Anesthesia Plan    Plan: MAC    Technique: MAC   2 ASA    "

## 2023-10-10 NOTE — Clinical Note
A LEAD SOLIA S PRO MRI 60 LEAD - [id number] - [id number] lead was placed using fluoroscopy. Right ventricular lead implanted using

## 2023-10-10 NOTE — DISCHARGE INSTRUCTIONS
Please follow up with cardiology, as directed for an incision check within 7-10 days.  You have an appointment with an Advanced Practice Provider on 10/17/23 at 8:15 am located at [address] [place] [address]. [address], [place].  For any questions or concerns, please do not hesitate to contact the office at [phone].     Medications:  Continue your medications as previously prescribed.    PACEMAKER DISCHARGE INSTRUCTIONS    * Returning to work, driving, and other daily activities should be discussed with your doctor before discharge from the hospital. Generally, driving is safe after 24 hours unless you are taking narcotics for pain relief.    * Notify your doctor immediately if you notice any of the following: fever, chills, warmth, redness, swelling, or drainage at your incision.    * There are some restrictions for your arm on the same side as your device:    * Do not reach behind your back or lift your arm above shoulder level for six weeks. After this period, it is usually safe to resume normal physical activities.    * Avoid activities that involve exertional or vigorous movements of the affected arm, such as golf or tennis, until you are cleared at your 6 week follow up visit.    * Avoid lifting any objects greater than ten pounds for six weeks.    * Pacemaker are now MRI compatible. Please discuss this further with your electrophysiologist before scheduling an MRI.    * House-hold electronics (microwaves, cellular phones) are safe for use.    * Place the temporary identification card in your wallet at time of discharge and replace it with your pacemaker card once it is mailed to your home in four to eight weeks. Make sure you inform healthcare providers that you have an implanted device, particularly if surgical procedures are scheduled.    * Please read your device 's information booklet for future reference. Write down any questions you may have and bring a list with you to your follow-up  appointment.    * You were provided instructions for use of your home monitoring system. This was also demonstrated for you. Please review the information and connect the monitor at your bedside, if applicable. Perform a manual transmission tomorrow morning, as described in the instructions.     * Please keep all appointments to have your device checked. You will be scheduled for a follow-up appointment to assess your incision in 1-2 weeks. You will also be scheduled to have an office visit for device check in 4-6 weeks. After that, your office device interrogations will usually be every 6-12 months.    WOUND CARE:    * You can remove your dressing and arm sling the morning after the procedure.    * Keep your incision dry for 48 hours. When showering after that, keep the incision out of a direct stream of water for one week, and use soap and water only. Do not scrub the incision. Instead, pat it dry. Do not apply any creams, lotions, or powders to your incision.    * If your incision has steri-strips, do not remove them. It is ok if they fall off on their own, if they do not fall off during the first week after the procedure then we will remove them at your wound check in 1 week.    * If your incision has medical glue, it will flake off on its own. Do not pick it off.    * You should avoid submerging the incision under water such as in a hot tub, bath tub, swimming pool, or ocean until your incision is completely healed in 2 weeks.    THINGS YOU SHOULD AVOID:    * Any large magnets including industrial equipment, induction furnaces, welding, or large electrical motors.    * Security systems at airports do not interfere with your device. However, your device is made of metal and will therefore set off metal detectors. Have your device identification card ready for presentation to the . They will search the rest of your body using a handheld wand.    * Some medical procedures use cautery, diathermy,  ultrasound therapy, and radiation therapy which may interfere with your device. Notify the performing physician that you have a pacemaker.    * Do not push on the area around your device or twist the device under the skin.

## 2023-10-10 NOTE — Clinical Note
A LEAD SOLIA S PRO MRI 53 LEAD - [id number] - [id number] lead was placed using fluoroscopy. Atrial lead implanted using

## 2023-10-10 NOTE — POST-PROCEDURE NOTE
"EP Service Post Op Check-Device Implantation    57 y.o. female s/p Biotronik Dual Chamber Left Bundle Permanent Pacemaker implantation by Dr. Mo Ramirez. Patient is feeling well. She denies chest pain or shortness of breath. No pain at incision site.     Visit Vitals  /72   Pulse 60   Temp 36.4 °C (97.6 °F) (Temporal)   Resp 18   Ht 1.753 m (5' 9\")   Wt 71.7 kg (158 lb)   SpO2 99%   BMI 23.33 kg/m²     Left chest incision C/D/I, no bleeding or hematoma. Left arm sling and pressure dressing in place    Assessment/Plan:  1) Sinus Node Dysfunction / Fatigue / Near syncope / dizziness s/p successful implantation of a Biotronik Dual Chamber Left Bundle Permanent Pacemaker    -Device programmed to DDD-CLS  bpm  -Device is MRI conditional  -Patient is NOT device dependent    -bedrest x 1 hours  -left arm sling and dressing overnight  -post op chest x-ray & EKG reviewed by physician  -repeat device interrogation will be completed prior to discharge  -keep incision dry for 3 days  -device education, wound care, and arm restrictions reviewed with patient in detail  -remove dressing tomorrow  -pain control with prn Tylenol  -surgical prophylaxis completed with Ancef infused pre-procedure, no additional antibiotics necessary.  -remote monitor paired with device  -anticipated discharge to home later today  -F/U with Dr. Ramirez's Nurse Practitioner on 10/17/23 at 8:15 am for incision/device check    PATRICIA Soliman  10/10/2023 1:00 PM  "

## 2023-10-10 NOTE — POST-PROCEDURE NOTE
Same Day Discharge        Discharge instructions given with the emphasis on importance of follow-up visit with physician and evaluation of access site until healed. The representative from the implanted device company educated the patient regarding home monitor set up and proper use.         Pt d/c home per order. Discharge instructions reviewed at length by Ebony. Pt verbalized un understanding of d/c instructions. LCW pacer site dressing CDI. No s/s of bleeding/hematoma present. Pt escorted out via WC accompanied by RN . Belongings sent home with pt.

## 2023-10-10 NOTE — POST-PROCEDURE NOTE
Post procedure EKG and chest x-ray completed and shown to NATALIIA Aponte and Dr. Ramirez. Sling placed on left arm. Patient  updated over phone.

## 2023-10-10 NOTE — ANESTHESIOLOGIST PRE-PROCEDURE ATTESTATION
Pre-Procedure Patient Identification:  I am the Primary Anesthesiologist and have identified the patient on 10/10/23 at 11:12 AM.   I have confirmed the procedure(s) will be performed by the following surgeon/proceduralist Mo Ramirez MD.

## 2023-10-10 NOTE — ANESTHESIA POSTPROCEDURE EVALUATION
Patient: Elisabet Kay    Procedure Summary     Date: 10/10/23 Room / Location:  [facility] 5 / [facility] CARDIAC CATH/EP/NEURO    Anesthesia Start: 1124 Anesthesia Stop: 1247    Procedure: PPM - dual chamber new Diagnosis:       Abnormal EKG      (Abnormal EKG [R94.31])    Providers: Mo Ramirez MD Responsible Provider: Kenroy Stephenson DO    Anesthesia Type: MAC ASA Status: 2          Anesthesia Type: MAC  PACU Vitals  10/10/2023 1242 - 10/10/2023 1301      10/10/2023  1245             BP: 129/72    Pulse: 60    Resp: 18    SpO2: 99 %            Anesthesia Post Evaluation    Pain management: adequate  Patient location during evaluation: PACU  Patient participation: complete - patient participated  Level of consciousness: awake and alert  Cardiovascular status: acceptable  Airway Patency: adequate  Respiratory status: acceptable  Hydration status: acceptable  Anesthetic complications: no

## 2023-10-10 NOTE — Clinical Note
Patient placed on procedure table in supine position with arms at side. Positioning devices: arm board under arms, heel pads in place, safety strap applied, wrist straps in place and donut foam under head.

## 2023-10-11 LAB
ATRIAL RATE: 91
PR INTERVAL: 96
QRS DURATION: 140
QT INTERVAL: 464
QTC CALCULATION(BAZETT): 570
R AXIS: -48
T WAVE AXIS: 13
VENTRICULAR RATE: 91

## 2023-10-11 PROCEDURE — 93010 ELECTROCARDIOGRAM REPORT: CPT | Performed by: INTERNAL MEDICINE

## 2023-10-17 ENCOUNTER — TRANSCRIBE ORDERS (OUTPATIENT)
Dept: SCHEDULING | Age: 58
End: 2023-10-17

## 2023-10-17 DIAGNOSIS — R94.31 ABNORMAL ELECTROCARDIOGRAM (ECG) (EKG): ICD-10-CM

## 2023-10-17 DIAGNOSIS — Z86.79 PERSONAL HISTORY OF OTHER DISEASES OF THE CIRCULATORY SYSTEM: ICD-10-CM

## 2023-10-17 DIAGNOSIS — Z95.0 PRESENCE OF CARDIAC PACEMAKER: Primary | ICD-10-CM

## 2023-10-18 ENCOUNTER — HOSPITAL ENCOUNTER (OUTPATIENT)
Dept: CARDIOLOGY | Facility: HOSPITAL | Age: 58
Discharge: HOME | End: 2023-10-18
Payer: COMMERCIAL

## 2023-10-18 DIAGNOSIS — Z86.79 PERSONAL HISTORY OF OTHER DISEASES OF THE CIRCULATORY SYSTEM: ICD-10-CM

## 2023-10-18 DIAGNOSIS — R94.31 ABNORMAL ELECTROCARDIOGRAM (ECG) (EKG): ICD-10-CM

## 2023-10-18 DIAGNOSIS — Z95.0 PRESENCE OF CARDIAC PACEMAKER: ICD-10-CM

## 2023-10-18 PROCEDURE — 93971 EXTREMITY STUDY: CPT | Mod: LT

## 2023-11-15 ENCOUNTER — OFFICE VISIT (OUTPATIENT)
Dept: SURGERY | Facility: CLINIC | Age: 58
End: 2023-11-15
Payer: COMMERCIAL

## 2023-11-15 VITALS
BODY MASS INDEX: 23.61 KG/M2 | WEIGHT: 159.4 LBS | HEIGHT: 69 IN | HEART RATE: 75 BPM | TEMPERATURE: 97.6 F | DIASTOLIC BLOOD PRESSURE: 84 MMHG | OXYGEN SATURATION: 98 % | SYSTOLIC BLOOD PRESSURE: 126 MMHG

## 2023-11-15 DIAGNOSIS — Z13.9 VISIT FOR SCREENING: Primary | ICD-10-CM

## 2023-11-15 PROCEDURE — 3008F BODY MASS INDEX DOCD: CPT | Performed by: NURSE PRACTITIONER

## 2023-11-15 PROCEDURE — 99213 OFFICE O/P EST LOW 20 MIN: CPT | Performed by: NURSE PRACTITIONER

## 2023-11-15 RX ORDER — APIXABAN 5 MG/1
5 TABLET, FILM COATED ORAL 2 TIMES DAILY
COMMUNITY
Start: 2023-10-25

## 2023-11-15 NOTE — PROGRESS NOTES
[facility]  VICENTA Bazzi  [address] [place] [address]  Phone: [phone]  Fax: [phone]      Patient ID: Elisabet Kay                              : 1965    Visit Date: 11/15/2023  Referring Provider: No ref. provider found   PCP: Armando Ritchie MD  GYN: No care team member to display    Subjective: Pattie returns to the office today in follow-up.  She was previously seen for a palpable lump in her right breast which ultimately was not noted on exam previously.  Today she notes no changes in her self-exam and has no breast complaints or concerns.  She does however note that she had a recent pacemaker placed for sick sinus syndrome and ultimately developed a postoperative blood clot in her left upper extremity      Breast Health:  Age at menarche: 12  Age at first live birth: 39   Age of menopause: 52      Allergies: No known drug allergies    Current Medications: has a current medication list which includes the following prescription(s): bupropion xl, cholecalciferol (vitamin d3), concerta, levothyroxine, lorazepam, and methylphenidate hcl.    Past Medical History:  has a past medical history of Arm DVT (deep venous thromboembolism), acute (CMS/HCC) and Disease of thyroid gland.    She has no past medical history of Fibroid, Melanoma (CMS/HCC), or Sleep apnea.    Past Surgical History:  has a past surgical history that includes Total abdominal hysterectomy (2012); Dilation and curettage of uterus; Cervical cone biopsy; [place] tooth extraction; Knee surgery (Right); and Colonoscopy ().    Social History:   Social History     Tobacco Use    Smoking status: Some Days     Types: Cigarettes     Passive exposure: Past    Smokeless tobacco: Never    Tobacco comments:     5 cigarettes a week   Vaping Use    Vaping Use: Never used   Substance Use Topics    Alcohol use: Yes     Alcohol/week: 8.0 standard drinks of alcohol     Types: 8 Standard  drinks or equivalent per week     Comment: 1 glass of wine per day    Drug use: No       Family History: family history includes Endometrial cancer in her biological mother; Lung cancer in her maternal grandfather; Prostate cancer in her biological father.        Physical Exam:    Vitals: There were no vitals taken for this visit.  There is no height or weight on file to calculate BMI.    Physical Exam  Physical Examination performed in the supine and sitting position  BREAST SIZE: Medium  SYMMETRY yes       SKIN - Skin color, texture, turgor normal. No rashes or lesions Skin is without tethering, dimpling, retraction or increased vascularity  AREOLA - normal    NIPPLES -  normal without retraction and without discharge  LYMPH NODES: infra, supra, cervical, parasternal and axillary nodes normal bilaterally  BREAST TISSUE: Right breast normal without discrete lesions. Left Breast  normal without discrete lesions.       Breast Imaging: I have personally reviewed the reports and images as follows.  None since last visit  Impression:  Encounter for clinical breast exam  Recommendation and Plan:   This is a 57-year-old female who presented to the office today in follow-up.  She is doing well.  We did review her exam and she provided with a prescription for screening mammogram which we do early next year.  I will call her with any concerning findings.  Indications which would warrant sooner follow-up or diagnostic evaluation.  Lastly, she was reminded to continue with her routine and preventative health care.    All of the questions were answered.  The patient is in agreement with the treatment plan.      No follow-ups on file.        11/15/2023   9:44 AM        VICENTA Gurrola

## 2024-01-14 ENCOUNTER — HOSPITAL ENCOUNTER (EMERGENCY)
Facility: HOSPITAL | Age: 59
Discharge: HOME | End: 2024-01-14
Attending: EMERGENCY MEDICINE
Payer: COMMERCIAL

## 2024-01-14 VITALS
TEMPERATURE: 97.3 F | HEIGHT: 69 IN | HEART RATE: 66 BPM | OXYGEN SATURATION: 97 % | SYSTOLIC BLOOD PRESSURE: 140 MMHG | RESPIRATION RATE: 18 BRPM | DIASTOLIC BLOOD PRESSURE: 69 MMHG | WEIGHT: 157 LBS | BODY MASS INDEX: 23.25 KG/M2

## 2024-01-14 DIAGNOSIS — K21.9 GASTROESOPHAGEAL REFLUX DISEASE WITHOUT ESOPHAGITIS: ICD-10-CM

## 2024-01-14 DIAGNOSIS — R07.89 ATYPICAL CHEST PAIN: Primary | ICD-10-CM

## 2024-01-14 LAB
ALBUMIN SERPL-MCNC: 4.7 G/DL (ref 3.5–5.7)
ALP SERPL-CCNC: 54 IU/L (ref 34–125)
ALT SERPL-CCNC: 16 IU/L (ref 7–52)
ANION GAP SERPL CALC-SCNC: 6 MEQ/L (ref 3–15)
AST SERPL-CCNC: 18 IU/L (ref 13–39)
BASOPHILS # BLD: 0.05 K/UL (ref 0.01–0.1)
BASOPHILS NFR BLD: 0.6 %
BILIRUB SERPL-MCNC: 0.4 MG/DL (ref 0.3–1.2)
BUN SERPL-MCNC: 14 MG/DL (ref 7–25)
CALCIUM SERPL-MCNC: 10 MG/DL (ref 8.6–10.3)
CHLORIDE SERPL-SCNC: 102 MEQ/L (ref 98–107)
CO2 SERPL-SCNC: 29 MEQ/L (ref 21–31)
CREAT SERPL-MCNC: 0.9 MG/DL (ref 0.6–1.2)
D DIMER PPP IA.FEU-MCNC: <0.27 UG/ML FEU (ref 0–0.5)
DIFFERENTIAL METHOD BLD: ABNORMAL
EGFRCR SERPLBLD CKD-EPI 2021: >60 ML/MIN/1.73M*2
EOSINOPHIL # BLD: 0.11 K/UL (ref 0.04–0.36)
EOSINOPHIL NFR BLD: 1.4 %
ERYTHROCYTE [DISTWIDTH] IN BLOOD BY AUTOMATED COUNT: 11.5 % (ref 11.7–14.4)
GLUCOSE SERPL-MCNC: 110 MG/DL (ref 70–99)
HCT VFR BLDCO AUTO: 39.7 % (ref 35–45)
HGB BLD-MCNC: 13.6 G/DL (ref 11.8–15.7)
IMM GRANULOCYTES # BLD AUTO: 0.03 K/UL (ref 0–0.08)
IMM GRANULOCYTES NFR BLD AUTO: 0.4 %
LYMPHOCYTES # BLD: 2.32 K/UL (ref 1.2–3.5)
LYMPHOCYTES NFR BLD: 29.4 %
MCH RBC QN AUTO: 31.8 PG (ref 28–33.2)
MCHC RBC AUTO-ENTMCNC: 34.3 G/DL (ref 32.2–35.5)
MCV RBC AUTO: 92.8 FL (ref 83–98)
MONOCYTES # BLD: 0.64 K/UL (ref 0.28–0.8)
MONOCYTES NFR BLD: 8.1 %
NEUTROPHILS # BLD: 4.74 K/UL (ref 1.7–7)
NEUTS SEG NFR BLD: 60.1 %
NRBC BLD-RTO: 0 %
PDW BLD AUTO: 8.6 FL (ref 9.4–12.3)
PLATELET # BLD AUTO: 297 K/UL (ref 150–369)
POTASSIUM SERPL-SCNC: 4.2 MEQ/L (ref 3.5–5.1)
PROT SERPL-MCNC: 7.3 G/DL (ref 6–8.2)
RBC # BLD AUTO: 4.28 M/UL (ref 3.93–5.22)
SODIUM SERPL-SCNC: 137 MEQ/L (ref 136–145)
TROPONIN I SERPL HS-MCNC: <2 PG/ML
WBC # BLD AUTO: 7.89 K/UL (ref 3.8–10.5)

## 2024-01-14 PROCEDURE — 85379 FIBRIN DEGRADATION QUANT: CPT

## 2024-01-14 PROCEDURE — 93005 ELECTROCARDIOGRAM TRACING: CPT

## 2024-01-14 PROCEDURE — 3E033GC INTRODUCTION OF OTHER THERAPEUTIC SUBSTANCE INTO PERIPHERAL VEIN, PERCUTANEOUS APPROACH: ICD-10-PCS | Performed by: EMERGENCY MEDICINE

## 2024-01-14 PROCEDURE — 93005 ELECTROCARDIOGRAM TRACING: CPT | Performed by: EMERGENCY MEDICINE

## 2024-01-14 PROCEDURE — 36415 COLL VENOUS BLD VENIPUNCTURE: CPT

## 2024-01-14 PROCEDURE — 99284 EMERGENCY DEPT VISIT MOD MDM: CPT | Mod: 25

## 2024-01-14 PROCEDURE — 85025 COMPLETE CBC W/AUTO DIFF WBC: CPT

## 2024-01-14 PROCEDURE — 25000000 HC PHARMACY GENERAL

## 2024-01-14 PROCEDURE — 85025 COMPLETE CBC W/AUTO DIFF WBC: CPT | Performed by: EMERGENCY MEDICINE

## 2024-01-14 PROCEDURE — 63700000 HC SELF-ADMINISTRABLE DRUG

## 2024-01-14 PROCEDURE — 96374 THER/PROPH/DIAG INJ IV PUSH: CPT

## 2024-01-14 PROCEDURE — 84484 ASSAY OF TROPONIN QUANT: CPT | Performed by: EMERGENCY MEDICINE

## 2024-01-14 PROCEDURE — 80053 COMPREHEN METABOLIC PANEL: CPT | Performed by: EMERGENCY MEDICINE

## 2024-01-14 RX ORDER — SUCRALFATE 1 G/10ML
1 SUSPENSION ORAL ONCE
Status: COMPLETED | OUTPATIENT
Start: 2024-01-14 | End: 2024-01-14

## 2024-01-14 RX ORDER — PANTOPRAZOLE SODIUM 40 MG/10ML
40 INJECTION, POWDER, LYOPHILIZED, FOR SOLUTION INTRAVENOUS ONCE
Status: COMPLETED | OUTPATIENT
Start: 2024-01-14 | End: 2024-01-14

## 2024-01-14 RX ORDER — PANTOPRAZOLE SODIUM 40 MG/1
40 TABLET, DELAYED RELEASE ORAL
Qty: 14 TABLET | Refills: 0 | Status: SHIPPED | OUTPATIENT
Start: 2024-01-14

## 2024-01-14 RX ORDER — SUCRALFATE 1 G/1
1 TABLET ORAL
Qty: 42 TABLET | Refills: 0 | Status: SHIPPED | OUTPATIENT
Start: 2024-01-14 | End: 2024-01-28

## 2024-01-14 RX ADMIN — PANTOPRAZOLE SODIUM 40 MG: 40 INJECTION, POWDER, LYOPHILIZED, FOR SOLUTION INTRAVENOUS at 17:13

## 2024-01-14 RX ADMIN — SUCRALFATE 1 G: 1 SUSPENSION ORAL at 17:13

## 2024-01-14 ASSESSMENT — ENCOUNTER SYMPTOMS
PALPITATIONS: 0
LIGHT-HEADEDNESS: 0
COUGH: 0
NUMBNESS: 0
SHORTNESS OF BREATH: 0
ABDOMINAL PAIN: 0
FEVER: 0
DIARRHEA: 0
BACK PAIN: 0
MYALGIAS: 0
DIZZINESS: 0
WEAKNESS: 0
CHILLS: 0
VOMITING: 0
HEADACHES: 0
NAUSEA: 0

## 2024-01-14 ASSESSMENT — VISUAL ACUITY: OU: 1

## 2024-01-14 NOTE — ED PROVIDER NOTES
Emergency Medicine Note  HPI   HISTORY OF PRESENT ILLNESS     Patient is a 58-year-old female with a past medical history of sinus node dysfunction s/p pacemaker placement, right arm DVT, and hypothyroidism presenting to the emergency department today with midsternal chest pain x 3 days.  Pain is located in the midsternal region of her chest.  States is worse if she takes a deep breath in but after few deep breaths seems to somewhat subside.  At times seems worse if she is lying supine.  Does not worsen with exertion.  The pain has been constant over the past 3 days but waxes and wanes in severity.  She tried a dose of Nexium this morning which had no effect but she typically does not take any acid reducers.  Patient was diagnosed with sinus node dysfunction and had a pacemaker placed by Dr. Ramirez about 3 months ago.  Her cardiologist is Dr. Gregg.  About a week and a half after having the pacemaker inserted she developed a right upper extremity blood clot and is now on Eliquis.  She is compliant on her medication.  Denies fever, chills, myalgias, headache, dizziness, lightheadedness, focal weakness, numbness, tingling, abdominal pain, nausea, vomiting, palpitations, shortness of breath.            Patient History   PAST HISTORY     Reviewed from Nursing Triage:       Past Medical History:   Diagnosis Date   • Arm DVT (deep venous thromboembolism), acute (CMS/HCC)     right arm   • Disease of thyroid gland        Past Surgical History:   Procedure Laterality Date   • CERVICAL CONE BIOPSY     • COLONOSCOPY  2020    no polyps per patient   • DILATION AND CURETTAGE OF UTERUS     • KNEE SURGERY Right     at age 25, meniscus tear   • TOTAL ABDOMINAL HYSTERECTOMY  09/27/2012    Severe squamous dysplasia, focal adenomyosis ([id number])   • WISDOM TOOTH EXTRACTION         Family History   Problem Relation Age of Onset   • Endometrial cancer Biological Mother    • Prostate cancer Biological Father    • Lung cancer  Maternal Grandfather        Social History     Tobacco Use   • Smoking status: Former     Types: Cigarettes     Passive exposure: Past   • Smokeless tobacco: Never   • Tobacco comments:     5 cigarettes a week   Vaping Use   • Vaping Use: Never used   Substance Use Topics   • Alcohol use: Yes     Alcohol/week: 8.0 standard drinks of alcohol     Types: 8 Standard drinks or equivalent per week     Comment: 1 glass of wine per day   • Drug use: No         Review of Systems   REVIEW OF SYSTEMS     Review of Systems   Constitutional: Negative for chills and fever.   Respiratory: Negative for cough and shortness of breath.    Cardiovascular: Positive for chest pain. Negative for palpitations.   Gastrointestinal: Negative for abdominal pain, diarrhea, nausea and vomiting.   Musculoskeletal: Negative for back pain and myalgias.   Neurological: Negative for dizziness, syncope, weakness, light-headedness, numbness and headaches.         VITALS     ED Vitals    Date/Time Temp Pulse Resp BP SpO2 [facility]   01/14/24 1645 -- 66 18 140/69 97 % PC   01/14/24 1550 -- 70 18 142/81 100 % PC   01/14/24 1535 36.3 °C (97.3 °F) 82 18 160/79 97 % LC        Pulse Ox %: 97 % (01/14/24 1558)  Pulse Ox Interpretation: Normal (01/14/24 1558)  Heart Rate: 82 (01/14/24 1558)  Rhythm Strip Interpretation: Paced Rhythm (01/14/24 1558)     Physical Exam   PHYSICAL EXAM     Physical Exam  Vitals and nursing note reviewed.   Constitutional:       General: She is awake. She is not in acute distress.     Appearance: She is well-developed. She is not ill-appearing, toxic-appearing or diaphoretic.   HENT:      Head: Normocephalic and atraumatic.      Right Ear: Hearing normal.      Left Ear: Hearing normal.      Nose: Nose normal. No congestion or rhinorrhea.      Mouth/Throat:      Lips: Pink.      Mouth: Mucous membranes are moist.   Eyes:      General: Lids are normal. Vision grossly intact.      Conjunctiva/sclera: Conjunctivae normal.   Cardiovascular:       Rate and Rhythm: Normal rate and regular rhythm.      Pulses:           Radial pulses are 2+ on the right side and 2+ on the left side.      Heart sounds: Normal heart sounds, S1 normal and S2 normal. No murmur heard.  Pulmonary:      Effort: Pulmonary effort is normal. No respiratory distress.      Breath sounds: Normal breath sounds. No decreased breath sounds.   Chest:      Comments: The pain does not worsen with palpation.  Abdominal:      Palpations: Abdomen is soft.      Tenderness: There is no abdominal tenderness.   Musculoskeletal:         General: No tenderness.      Cervical back: Neck supple.      Right lower leg: No edema.      Left lower leg: No edema.   Skin:     General: Skin is warm and dry.      Capillary Refill: Capillary refill takes less than 2 seconds.   Neurological:      General: No focal deficit present.      Mental Status: She is alert and oriented to person, place, and time.      Gait: Gait is intact.   Psychiatric:         Behavior: Behavior is cooperative.           PROCEDURES     Procedures     DATA     Results     Procedure Component Value Units Date/Time    D-dimer, quantitative [[id number]]  (Normal) Collected: 01/14/24 1540    Specimen: Blood, Venous Updated: 01/14/24 1702     D-Dimer, Quant <0.27 ug/mL FEU      Comment: Suggested Age Related Reference Range: 0.00 - 0.58  The D-Dimer assay can be used as an aid in the diagnosis of DVT or PE. The test can not be used by itself to exclude DVT or PE. When used as a diagnostic aid, the cutoff value is the same as the reference range: <0.5 ug/ml FEU.       HS Troponin I (with 2 hour reflex) [[id number]]  (Normal) Collected: 01/14/24 1540    Specimen: Blood, Venous Updated: 01/14/24 1628     High Sens Troponin I <2.0 pg/mL     Comprehensive metabolic panel [[id number]]  (Abnormal) Collected: 01/14/24 1540    Specimen: Blood, Venous Updated: 01/14/24 1611     Sodium 137 mEQ/L      Potassium 4.2 mEQ/L      Comment: Results obtained on  plasma. Plasma Potassium values may be up to 0.4 mEQ/L less than serum values. The differences may be greater for patients with high platelet or white cell counts.        Chloride 102 mEQ/L      CO2 29 mEQ/L      BUN 14 mg/dL      Creatinine 0.9 mg/dL      Glucose 110 mg/dL      Calcium 10.0 mg/dL      AST (SGOT) 18 IU/L      ALT (SGPT) 16 IU/L      Alkaline Phosphatase 54 IU/L      Total Protein 7.3 g/dL      Comment: Test performed on plasma which typically contains approximately 0.4 g/dL more protein than serum.        Albumin 4.7 g/dL      Bilirubin, Total 0.4 mg/dL      eGFR >60.0 mL/min/1.73m*2      Comment: Calculation based on the Chronic Kidney Disease Epidemiology Collaboration (CKD-EPI) equation refit without adjustment for race.        Anion Gap 6 mEQ/L     CBC and differential [[id number]]  (Abnormal) Collected: 01/14/24 1540    Specimen: Blood, Venous Updated: 01/14/24 1555     WBC 7.89 K/uL      RBC 4.28 M/uL      Hemoglobin 13.6 g/dL      Hematocrit 39.7 %      MCV 92.8 fL      MCH 31.8 pg      MCHC 34.3 g/dL      RDW 11.5 %      Platelets 297 K/uL      MPV 8.6 fL      Differential Type Auto     nRBC 0.0 %      Immature Granulocytes 0.4 %      Neutrophils 60.1 %      Lymphocytes 29.4 %      Monocytes 8.1 %      Eosinophils 1.4 %      Basophils 0.6 %      Immature Granulocytes, Absolute 0.03 K/uL      Neutrophils, Absolute 4.74 K/uL      Lymphocytes, Absolute 2.32 K/uL      Monocytes, Absolute 0.64 K/uL      Eosinophils, Absolute 0.11 K/uL      Basophils, Absolute 0.05 K/uL     RAINBOW GOLD [[id number]] Collected: 01/14/24 1540    Specimen: Blood, Venous Updated: 01/14/24 1548    RAINBOW RED [[id number]] Collected: 01/14/24 1540    Specimen: Blood, Venous Updated: 01/14/24 1547    [place] Draw Panel [[id number]] Collected: 01/14/24 1540    Specimen: Blood, Venous Updated: 01/14/24 1547    Narrative:      The following orders were created for panel order [place] Draw Panel.  Procedure                                Abnormality         Status                     ---------                               -----------         ------                     RAINBOW RED[[id number]]                                      In process                 RAINBOW LT BLUE[[id number]]                                  In process                 RAINBOW GOLD[[id number]]                                     In process                   Please view results for these tests on the individual orders.    RAINBOW LT BLUE [[id number]] Collected: 01/14/24 1540    Specimen: Blood, Venous Updated: 01/14/24 1547          Imaging Results    None         ECG 12 lead   Independent Interpretation by ED Provider   ECG  Rate: 73  Rhythm: Atrial paced rhythm  OH Interval: 190  QRS Duration: 82  QT/QTc: 406/447  Axis: 19 71 57    Impression: Atrial paced rhythm  Abnormal ECG          Scoring tools                                  ED Course & MDM   MDM / ED COURSE / CLINICAL IMPRESSION / DISPO     Medical Decision Making  Exam without evidence of volume overload so doubt heart failure. EKG without signs of active ischemia. Given the timing of pain to ER presentation, single troponin <2 delta so doubt NSTEMI. Presentation not consistent with acute PE (d-dimer negative), pneumothorax (breath sounds heard on auscultation bilaterally), thoracic aortic dissection, pericarditis, tamponade, pneumonia (no infectious symptoms), myocarditis (no recent illness, neg trop). HEART score: 2 so plan to discharge patient home with PCP follow up and will treat for GERD.     Atypical chest pain: acute illness or injury  Gastroesophageal reflux disease without esophagitis: acute illness or injury  Amount and/or Complexity of Data Reviewed  Labs: ordered. Decision-making details documented in ED Course.  ECG/medicine tests: ordered and independent interpretation performed.      Risk  Prescription drug management.          ED Course as of 01/14/24 1746   Sun Jan 14, 2024   2884 Patient is  hemodynamically stable, alert, resting comfortably during the evaluation. The care and workup of this patient was discussed with the attending physician Dr. Garcia.  Impression: Midsternal chest pain that waxes and wanes in severity for the past 3 days.  Worse when she takes a deep breath then but then slightly alleviated after taking multiple deep breaths.  Slightly worse in supine position.  Not worse with exertion.  Denies shortness of breath, dizziness, lightheadedness.  Plan: Labs, EKG, monitor and observe [EP]   1617 CBC and CMP are unremarkable [EP]   1705 High Sens Troponin I: <2.0 [EP]   1705 D-Dimer, Quant: <0.27 [EP]   1731 The workup is extremely reassuring.  We will treat for GERD as this pain seems to worsen in the supine position.  Call for a and Protonix given in ED and prescription sent to pharmacy.  Will follow-up with Dr. Le for further evaluation.  Discussed supportive measures.  See discharge instructions. Discussed precautions patient should return to the ED with if they were to occur. Patient verbalized understanding. All questions answered.   [EP]      ED Course User Index  [EP] Anna Stokes PA C     Clinical Impression      Atypical chest pain   Gastroesophageal reflux disease without esophagitis     _________________     ED Disposition   Discharge                   Anna Stokes PA C  01/14/24 0384

## 2024-01-14 NOTE — DISCHARGE INSTRUCTIONS
You were evaluated in the Emergency Department today for chest pain. Your evaluation has shown no signs of medical conditions requiring emergent intervention at this time, however we recommend that you follow up with your primary care physician as soon as possible for further testing as an outpatient.  Your cardiac enzymes and D-dimer (test for blood clots) were both negative.    You were given 2 medications that assist with reflux here in the emergency department.  They were sent over to your pharmacy.  Please pick these up and take as directed and follow-up with Dr. Ritchie for further evaluation and to see if these medications help with the midsternal chest pain.    Return to the Emergency Department if you experience worsening or uncontrolled chest pain, shortness of breath, light headedness, feeling faint, nausea, vomiting, or any other concerning symptoms.

## 2024-01-14 NOTE — ED ATTESTATION NOTE
Procedures  Physical Exam  Review of Systems  [facility]    1/14/20244:16 PM  I have personally seen and examined the patient. I was involved in the care and medical decision making for this patient.    I reviewed and agree with the PA/NP/Resident's assessment and plan of care, with any exceptions as documented below.    My focused history, examination, assessment, and plan of care of Elisabet Kay is as follows:  The patient presents with substernal chest pain that is been going on for 3 days.  Pain is waxing and waning.  In November she had a pacemaker placed and then subsequently developed an arm DVT.  Patient is on Eliquis.  Patient does not have shortness of breath.  Exam: No reproducible pain  Rate rhythm regular, S1-S2 heard, no murmurs  Lungs clear  Impression/Plan/Medical Decision Making: IV, labs, D-dimer, observe    EKG is paced.  Patient did mention something about the pain being worse while supine.  If everything is normal we will consider treating with Carafate and Protonix    Vital Signs Review: Vital signs have been reviewed. The oxygen saturation is  SpO2: 97 % which is normal .    I was physically present for the key/critical portions of the following procedures: None    This document was created using Dragon dictation software.  There might be some typographical errors due to this technology.    We are in a period of time with increased volumes and decreased capacity.      Romain Garcia MD  01/14/24 0411

## 2024-01-15 LAB
ATRIAL RATE: 73
P AXIS: 19
PR INTERVAL: 190
QRS DURATION: 82
QT INTERVAL: 406
QTC CALCULATION(BAZETT): 447
R AXIS: 71
T WAVE AXIS: 57
VENTRICULAR RATE: 73

## 2024-07-24 ENCOUNTER — HOSPITAL ENCOUNTER (OUTPATIENT)
Dept: RADIOLOGY | Age: 59
Discharge: HOME | End: 2024-07-24
Attending: NURSE PRACTITIONER
Payer: COMMERCIAL

## 2024-07-24 DIAGNOSIS — Z13.9 VISIT FOR SCREENING: ICD-10-CM

## 2024-07-24 PROCEDURE — 77063 BREAST TOMOSYNTHESIS BI: CPT

## 2024-08-05 ENCOUNTER — OFFICE VISIT (OUTPATIENT)
Dept: OBSTETRICS AND GYNECOLOGY | Facility: CLINIC | Age: 59
End: 2024-08-05
Payer: COMMERCIAL

## 2024-08-05 VITALS
SYSTOLIC BLOOD PRESSURE: 130 MMHG | BODY MASS INDEX: 24.14 KG/M2 | DIASTOLIC BLOOD PRESSURE: 72 MMHG | WEIGHT: 163 LBS | HEIGHT: 69 IN

## 2024-08-05 DIAGNOSIS — Z01.419 WELL WOMAN EXAM WITH ROUTINE GYNECOLOGICAL EXAM: Primary | ICD-10-CM

## 2024-08-05 DIAGNOSIS — Z12.31 ENCOUNTER FOR SCREENING MAMMOGRAM FOR MALIGNANT NEOPLASM OF BREAST: ICD-10-CM

## 2024-08-05 DIAGNOSIS — Z12.4 ROUTINE CERVICAL SMEAR: ICD-10-CM

## 2024-08-05 DIAGNOSIS — Z80.49 FAMILY HISTORY OF MALIGNANT NEOPLASM OF ENDOMETRIUM: ICD-10-CM

## 2024-08-05 DIAGNOSIS — M81.0 AGE-RELATED OSTEOPOROSIS WITHOUT CURRENT PATHOLOGICAL FRACTURE: ICD-10-CM

## 2024-08-05 PROCEDURE — S0612 ANNUAL GYNECOLOGICAL EXAMINA: HCPCS | Performed by: OBSTETRICS & GYNECOLOGY

## 2024-08-05 PROCEDURE — 3008F BODY MASS INDEX DOCD: CPT | Performed by: OBSTETRICS & GYNECOLOGY

## 2024-08-09 NOTE — PROGRESS NOTES
Elisabet is seen today 58 years old she has a daughter 20 Le and an 18-year-old daughter Jessica at [facility]    For mood Elisabet is on Ritalin and Wellbutrin    Heart lung she was recently diagnosed with sinus pilar disease she had a pacemaker placed in October 2023 she is doing quite well    Had a right breast lump she had a mammogram ultrasound that was normal she saw consultation she is going to continue with surveillance her mammogram in July 2024 Nataliya model is 10.5% category B breast    Had a calcium index 2023 0    DEXA scan was 2022 spine 0.6 hips -1 0.1-0.4 to be repeated in 2 to 3 years    Colonoscopy 2020 to be repeated in 5 to 10 years    Labs per primary    Pap test July 2023    Mom had endometrial cancer Elisabet underwent a total abdominal hysterectomy for atypical endometrial hyperplasia her brother also has prostate cancer of encouraged genetic testing due to the family history also of note Elisabet had DVTs twice in the axilla she would not be a good candidate for HRT    Her neck was supple no masses    Both breasts appeared to be normal bilaterally attention was paid to the right outer quadrant there was no area of atypia    Abdomen was soft nontender    Uterus was midposition to no adnexal masses    Normal GYN checkup plan as above

## 2024-08-31 LAB
25(OH)D3+25(OH)D2 SERPL-MCNC: 49.4 NG/ML (ref 30–100)
ALBUMIN SERPL-MCNC: 4.4 G/DL (ref 3.8–4.9)
ALP SERPL-CCNC: 59 IU/L (ref 44–121)
ALT SERPL-CCNC: 17 IU/L (ref 0–32)
AST SERPL-CCNC: 16 IU/L (ref 0–40)
B BURGDOR IGG+IGM SER QL IA: NEGATIVE
BASOPHILS # BLD AUTO: 0.1 X10E3/UL (ref 0–0.2)
BASOPHILS NFR BLD AUTO: 1 %
BILIRUB SERPL-MCNC: 0.2 MG/DL (ref 0–1.2)
BUN SERPL-MCNC: 15 MG/DL (ref 6–24)
BUN/CREAT SERPL: 18 (ref 9–23)
CALCIUM SERPL-MCNC: 10.1 MG/DL (ref 8.7–10.2)
CHLORIDE SERPL-SCNC: 104 MMOL/L (ref 96–106)
CHOLEST SERPL-MCNC: 239 MG/DL (ref 100–199)
CO2 SERPL-SCNC: 25 MMOL/L (ref 20–29)
CREAT SERPL-MCNC: 0.84 MG/DL (ref 0.57–1)
EGFRCR SERPLBLD CKD-EPI 2021: 80 ML/MIN/1.73
EOSINOPHIL # BLD AUTO: 0.1 X10E3/UL (ref 0–0.4)
EOSINOPHIL NFR BLD AUTO: 1 %
ERYTHROCYTE [DISTWIDTH] IN BLOOD BY AUTOMATED COUNT: 11.6 % (ref 11.7–15.4)
GLOBULIN SER CALC-MCNC: 2.4 G/DL (ref 1.5–4.5)
GLUCOSE SERPL-MCNC: 95 MG/DL (ref 70–99)
HBA1C MFR BLD: 5.6 % (ref 4.8–5.6)
HCT VFR BLD AUTO: 39.9 % (ref 34–46.6)
HDLC SERPL-MCNC: 64 MG/DL
HGB BLD-MCNC: 13.8 G/DL (ref 11.1–15.9)
IMM GRANULOCYTES # BLD AUTO: 0 X10E3/UL (ref 0–0.1)
IMM GRANULOCYTES NFR BLD AUTO: 0 %
LDLC SERPL CALC-MCNC: 157 MG/DL (ref 0–99)
LYMPHOCYTES # BLD AUTO: 2 X10E3/UL (ref 0.7–3.1)
LYMPHOCYTES NFR BLD AUTO: 34 %
MCH RBC QN AUTO: 32.2 PG (ref 26.6–33)
MCHC RBC AUTO-ENTMCNC: 34.6 G/DL (ref 31.5–35.7)
MCV RBC AUTO: 93 FL (ref 79–97)
MONOCYTES # BLD AUTO: 0.5 X10E3/UL (ref 0.1–0.9)
MONOCYTES NFR BLD AUTO: 8 %
NEUTROPHILS # BLD AUTO: 3.3 X10E3/UL (ref 1.4–7)
NEUTROPHILS NFR BLD AUTO: 56 %
PLATELET # BLD AUTO: 308 X10E3/UL (ref 150–450)
POTASSIUM SERPL-SCNC: 4.4 MMOL/L (ref 3.5–5.2)
PROT SERPL-MCNC: 6.8 G/DL (ref 6–8.5)
RBC # BLD AUTO: 4.29 X10E6/UL (ref 3.77–5.28)
SODIUM SERPL-SCNC: 141 MMOL/L (ref 134–144)
TRIGL SERPL-MCNC: 101 MG/DL (ref 0–149)
TSH SERPL DL<=0.005 MIU/L-ACNC: 1.36 UIU/ML (ref 0.45–4.5)
VLDLC SERPL CALC-MCNC: 18 MG/DL (ref 5–40)
WBC # BLD AUTO: 5.9 X10E3/UL (ref 3.4–10.8)

## 2024-09-03 LAB — LEAD BLDV-MCNC: 2.6 UG/DL (ref 0–3.4)

## 2024-10-18 ENCOUNTER — TRANSCRIBE ORDERS (OUTPATIENT)
Dept: SCHEDULING | Age: 59
End: 2024-10-18

## 2024-10-18 DIAGNOSIS — Z95.0 PRESENCE OF CARDIAC PACEMAKER: Primary | ICD-10-CM

## 2024-10-18 DIAGNOSIS — E78.2 MIXED HYPERLIPIDEMIA: ICD-10-CM

## 2024-10-18 DIAGNOSIS — Z86.79 PERSONAL HISTORY OF OTHER DISEASES OF THE CIRCULATORY SYSTEM: ICD-10-CM

## 2024-10-24 ENCOUNTER — HOSPITAL ENCOUNTER (OUTPATIENT)
Dept: RADIOLOGY | Facility: CLINIC | Age: 59
Discharge: HOME | End: 2024-10-24
Attending: OBSTETRICS & GYNECOLOGY
Payer: COMMERCIAL

## 2024-10-24 DIAGNOSIS — M81.0 AGE-RELATED OSTEOPOROSIS WITHOUT CURRENT PATHOLOGICAL FRACTURE: ICD-10-CM

## 2024-10-24 PROCEDURE — 77080 DXA BONE DENSITY AXIAL: CPT

## 2024-11-05 ENCOUNTER — HOSPITAL ENCOUNTER (OUTPATIENT)
Dept: RADIOLOGY | Age: 59
Discharge: HOME | End: 2024-11-05
Attending: INTERNAL MEDICINE

## 2024-11-05 DIAGNOSIS — Z86.79 PERSONAL HISTORY OF OTHER DISEASES OF THE CIRCULATORY SYSTEM: ICD-10-CM

## 2024-11-05 DIAGNOSIS — Z95.0 PRESENCE OF CARDIAC PACEMAKER: ICD-10-CM

## 2024-11-05 DIAGNOSIS — E78.2 MIXED HYPERLIPIDEMIA: ICD-10-CM

## 2024-11-05 PROCEDURE — 75571 CT HRT W/O DYE W/CA TEST: CPT

## 2025-06-09 ENCOUNTER — TELEPHONE (OUTPATIENT)
Dept: OBSTETRICS AND GYNECOLOGY | Facility: CLINIC | Age: 60
End: 2025-06-09

## 2025-06-09 NOTE — PROGRESS NOTES
OFFICE GYN - PROBLEM VISIT    Chief Complaint   Patient presents with    Vaginal Prolapse     Wants to get checked for UTI          HPI:  Elisabet Kay is 59 y.o.  presenting today with a chief complaint of vaginal discomfort     Symptoms x 1 month after long trip with lots of walking in [place]   Suprapubic pressure  Urinary urgency and hesitancy   Micro a bulge this weekend   No dysuria   Some difficulty evacuating her bowels   No bleeding   No discharge or odor     Of note - s/p LUDWIN in 2012 due to CIN3 with microinvasion not excluded   Hx of 2  at term     Medications:     Current Outpatient Medications:     buPROPion XL (WELLBUTRIN XL) 150 mg 24 hr tablet, , Disp: , Rfl:     cholecalciferol, vitamin D3, 1,000 unit (25 mcg) tablet, Take 1,000 Units by mouth daily., Disp: , Rfl:     CONCERTA 54 mg ER tablet, Take 54 mg by mouth daily., Disp: , Rfl:     ELIQUIS 5 mg tablet, Take 5 mg by mouth 2 (two) times a day., Disp: , Rfl:     levothyroxine (SYNTHROID) 100 mcg tablet, Take 100 mcg by mouth., Disp: , Rfl:     LORazepam (ATIVAN) 1 mg tablet, Take 1 tablet (1 mg total) by mouth nightly as needed for sleep for up to 2 days., Disp: 2 tablet, Rfl: 0    METHYLPHENIDATE HCL (CONCERTA ORAL), Take 10 mg by mouth once. Takes lower does when she's not eating., Disp: , Rfl:     pantoprazole (PROTONIX) 40 mg EC tablet, Take 1 tablet (40 mg total) by mouth daily before breakfast., Disp: 14 tablet, Rfl: 0    sucralfate (CARAFATE) 1 gram tablet, Take 1 tablet (1 g total) by mouth 3 (three) times a day before meals for 14 days., Disp: 42 tablet, Rfl: 0      Allergies:   is allergic to no known drug allergies.     Obstetric History:   OB History    Para Term  AB Living   3 2       SAB IAB Ectopic Multiple Live Births             # Outcome Date GA Lbr Jeremias/2nd Weight Sex Type Anes PTL Lv   3             2 Para            1 Para                GYN History:   Gynecology History       Menstrual Status:  "Postmenopausal  LMP:   Age at menarche: 12  Age at first pregnancy: 38  Age at first live birth: 39  Age at menopause: 52  Months of breastfeedin                         Past Medical History:    has a past medical history of Arm DVT (deep venous thromboembolism), acute (CMS/HCC) and Disease of thyroid gland.    She has no past medical history of Fibroid, Melanoma (CMS/HCC), or Sleep apnea.     Past Surgical History:    has a past surgical history that includes Total abdominal hysterectomy (2012); Dilation and curettage of uterus; Cervical cone biopsy; [place] tooth extraction; Knee surgery (Right); and Colonoscopy (2020).    Social History:   Social History     Tobacco Use    Smoking status: Former     Types: Cigarettes     Passive exposure: Past    Smokeless tobacco: Never    Tobacco comments:     5 cigarettes a week   Vaping Use    Vaping status: Never Used   Substance Use Topics    Alcohol use: Yes     Alcohol/week: 8.0 standard drinks of alcohol     Types: 8 Standard drinks or equivalent per week     Comment: 1 glass of wine per day    Drug use: No         Family History: Non-contributory    ROS: Negative except as detailed in HPI     Vital Signs for this encounter: Visit Vitals  /76 (BP Location: Left upper arm, Patient Position: Sitting)   Ht 1.753 m (5' 9\")   Wt 71.5 kg (157 lb 9.6 oz)   Breastfeeding No   BMI 23.27 kg/m²       Constitutional: General appearance well-nourished  Genitalia:  External: Normal appearing labia and vagina without masses or lesions. With valsalva, stage 1 rectocele is visible  Speculum: normal appearing vagina without discharge. Cervix surgically absent. No apical or anterior prolapse seen.  Bimanual Exam:  Cervix/uterus absent. No adnexal masses. Grade 1 rectocele.      Impression/Plan:    Elisabet Kay is a 59 y.o. is presenting today for pelvic organ prolapse     We reviewed physical exam findings notable for mild stage 1 rectocele  Bladder and vaginal apex " appear well supported.  Counseled on treatment options including expectant management, pelvic floor PT, pessaries and surgical repair.  Given that she is having significant discomfort/disruption to her daily life activities, I would recommend beginning with pelvic floor PT.   If this does not give her good results in the next 3-6 months, could consider surgical correction with urogyn.    Also sent urine culture given urinary symptoms.      I spent total of 30 minutes on this encounter on chart review, patient care and counseling, documentation     Problem List Items Addressed This Visit    None  Visit Diagnoses         Urinary urgency    -  Primary    Relevant Orders    POCT urinalysis dipstick (Completed)    Urine culture      Pelvic organ prolapse quantification stage 1 rectocele        Relevant Orders    Ambulatory Referral to Pelvic Floor Rehab (Physical Therapy)          Desiree Hoksins MD

## 2025-06-09 NOTE — TELEPHONE ENCOUNTER
Provider: Any   Appointment Type: Problem Visit   Reason for Visit: Vaginal Prolapse of some sort, not diagnosed but she states it is obvious.   Active Symptoms Yes   Onset of Symptoms Noticed over the weekend   Available Day and Time:   Best Contact Number: [phone]

## 2025-06-10 ENCOUNTER — OFFICE VISIT (OUTPATIENT)
Dept: OBSTETRICS AND GYNECOLOGY | Facility: CLINIC | Age: 60
End: 2025-06-10
Payer: COMMERCIAL

## 2025-06-10 VITALS
HEIGHT: 69 IN | WEIGHT: 157.6 LBS | BODY MASS INDEX: 23.34 KG/M2 | DIASTOLIC BLOOD PRESSURE: 76 MMHG | SYSTOLIC BLOOD PRESSURE: 132 MMHG

## 2025-06-10 DIAGNOSIS — N81.6 PELVIC ORGAN PROLAPSE QUANTIFICATION STAGE 1 RECTOCELE: Primary | ICD-10-CM

## 2025-06-10 DIAGNOSIS — R39.15 URINARY URGENCY: ICD-10-CM

## 2025-06-10 LAB
BACTERIA, POC: ABNORMAL
BILIRUBIN, POC: NEGATIVE
BLOOD URINE, POC: NEGATIVE
CLARITY, POC: CLEAR
COLOR, POC: YELLOW
EXPIRATION DATE: ABNORMAL
GLUCOSE URINE, POC: NEGATIVE
KETONES, POC: ABNORMAL
LEUKOCYTE EST, POC: NEGATIVE
Lab: ABNORMAL
NITRITE, POC: NEGATIVE
PH, POC: 0
POCT MANUFACTURER: ABNORMAL
PROTEIN, POC: ABNORMAL
SPECIFIC GRAVITY, POC: 0

## 2025-06-10 PROCEDURE — 3008F BODY MASS INDEX DOCD: CPT | Performed by: OBSTETRICS & GYNECOLOGY

## 2025-06-10 PROCEDURE — 81002 URINALYSIS NONAUTO W/O SCOPE: CPT | Performed by: OBSTETRICS & GYNECOLOGY

## 2025-06-10 PROCEDURE — 99214 OFFICE O/P EST MOD 30 MIN: CPT | Performed by: OBSTETRICS & GYNECOLOGY

## 2025-06-12 ENCOUNTER — RESULTS FOLLOW-UP (OUTPATIENT)
Dept: OBSTETRICS AND GYNECOLOGY | Facility: CLINIC | Age: 60
End: 2025-06-12

## 2025-06-12 LAB
BACTERIA UR CULT: NORMAL
BACTERIA UR CULT: NORMAL

## 2025-06-26 ENCOUNTER — HOSPITAL ENCOUNTER (OUTPATIENT)
Dept: RADIOLOGY | Facility: CLINIC | Age: 60
Discharge: HOME | End: 2025-06-26
Attending: FAMILY MEDICINE
Payer: COMMERCIAL

## 2025-06-26 DIAGNOSIS — R10.2 PERINEAL NEURALGIA: ICD-10-CM

## 2025-06-26 PROCEDURE — 76830 TRANSVAGINAL US NON-OB: CPT

## 2025-06-27 ENCOUNTER — TELEPHONE (OUTPATIENT)
Dept: OBSTETRICS AND GYNECOLOGY | Facility: CLINIC | Age: 60
End: 2025-06-27

## 2025-06-30 ENCOUNTER — TELEPHONE (OUTPATIENT)
Dept: OBSTETRICS AND GYNECOLOGY | Facility: CLINIC | Age: 60
End: 2025-06-30

## 2025-06-30 ENCOUNTER — APPOINTMENT (OUTPATIENT)
Dept: LAB | Facility: HOSPITAL | Age: 60
End: 2025-06-30
Attending: OBSTETRICS & GYNECOLOGY
Payer: COMMERCIAL

## 2025-06-30 ENCOUNTER — OFFICE VISIT (OUTPATIENT)
Dept: OBSTETRICS AND GYNECOLOGY | Facility: CLINIC | Age: 60
End: 2025-06-30
Payer: COMMERCIAL

## 2025-06-30 VITALS
SYSTOLIC BLOOD PRESSURE: 148 MMHG | HEIGHT: 69 IN | WEIGHT: 159.5 LBS | BODY MASS INDEX: 23.62 KG/M2 | DIASTOLIC BLOOD PRESSURE: 92 MMHG

## 2025-06-30 DIAGNOSIS — N83.8 BILATERAL TUBO-OVARIAN MASS: ICD-10-CM

## 2025-06-30 DIAGNOSIS — Z90.710 S/P HYSTERECTOMY: ICD-10-CM

## 2025-06-30 DIAGNOSIS — N83.8 BILATERAL TUBO-OVARIAN MASS: Primary | ICD-10-CM

## 2025-06-30 LAB — CANCER AG125 SERPL-ACNC: 7 U/ML (ref 0–35)

## 2025-06-30 PROCEDURE — 36415 COLL VENOUS BLD VENIPUNCTURE: CPT

## 2025-06-30 PROCEDURE — 86304 IMMUNOASSAY TUMOR CA 125: CPT

## 2025-06-30 PROCEDURE — 99214 OFFICE O/P EST MOD 30 MIN: CPT | Performed by: OBSTETRICS & GYNECOLOGY

## 2025-06-30 PROCEDURE — 3008F BODY MASS INDEX DOCD: CPT | Performed by: OBSTETRICS & GYNECOLOGY

## 2025-06-30 NOTE — TELEPHONE ENCOUNTER
Pt has appt with Gyn oncologist for Tuesday 7/8 and pt is trying to get appt sooner. Pt requesting auth for CT scan be obtained as soon as possible so pt can get CT scan prior to appt. Pt can be reached at [phone]

## 2025-06-30 NOTE — PROGRESS NOTES
OFFICE GYN - PROBLEM VISIT    Chief Complaint   Patient presents with    Follow-up        HPI:  Elisabet Kay is 59 y.o.  presenting today with a chief complaint of follow up for pelvic US.     Right arm DVT   Left arm SVT after pace maker placed    Saw 6/10- vaginal prolapse   S/p LUDWIN in  due to CIN3 - cervix     - complex multilocular lesion with low level internal echos and internal septations versus internal daughter cyst   Ovary ia 12.9 x 13.8 x 12.2cm with complex lesion measuring 10.9 x 11.3x 10.5     Gynecologic oncology referral placed     Feeling pressure on and off for a while - more concerning for the past month   Had a UTI check and visit for prolapse   Last week feeling pressure higher up on her abdomen- contacted her PCP and ordered     Denies weight loss/weight gain, gets some sweating with nervousness, pelvic pressure present   Medications:     Current Outpatient Medications:     cholecalciferol, vitamin D3, 1,000 unit (25 mcg) tablet, Take 1,000 Units by mouth daily., Disp: , Rfl:     CONCERTA 54 mg ER tablet, Take 54 mg by mouth daily., Disp: , Rfl:     levothyroxine (SYNTHROID) 100 mcg tablet, Take 100 mcg by mouth., Disp: , Rfl:     LORazepam (ATIVAN) 1 mg tablet, Take 1 tablet (1 mg total) by mouth nightly as needed for sleep for up to 2 days., Disp: 2 tablet, Rfl: 0    METHYLPHENIDATE HCL (CONCERTA ORAL), Take 10 mg by mouth once. Takes lower does when she's not eating., Disp: , Rfl:       Allergies:   is allergic to no known drug allergies.     Obstetric History:   OB History    Para Term  AB Living   3 2       SAB IAB Ectopic Multiple Live Births             # Outcome Date GA Lbr Jeremias/2nd Weight Sex Type Anes PTL Lv   3             2 Para            1 Para      Vag-Spont          GYN History:   Gynecology History       Menstrual Status: Postmenopausal  LMP:   Age at menarche: 12  Age at first pregnancy: 38  Age at first live birth: 39  Age at menopause:  "52  Months of breastfeedin                         Past Medical History:    has a past medical history of Arm DVT (deep venous thromboembolism), acute (CMS/HCC), Disease of thyroid gland, and Sick sinus syndrome (CMS/HCC).    She has no past medical history of Fibroid, Melanoma (CMS/HCC), or Sleep apnea.     Past Surgical History:    has a past surgical history that includes Total abdominal hysterectomy (2012); Dilation and curettage of uterus; Cervical cone biopsy; [place] tooth extraction; Knee surgery (Right); Colonoscopy (); and Upper gastrointestinal endoscopy ().    Social History:   Social History     Tobacco Use    Smoking status: Former     Types: Cigarettes     Passive exposure: Past    Smokeless tobacco: Never    Tobacco comments:     5 cigarettes a week   Vaping Use    Vaping status: Never Used   Substance Use Topics    Alcohol use: Yes     Alcohol/week: 8.0 standard drinks of alcohol     Types: 8 Standard drinks or equivalent per week     Comment: 1 glass of wine per day    Drug use: No         Family History: Non-contributory    ROS: Negative except as detailed in HPI     Vital Signs for this encounter: Visit Vitals  BP (!) 148/92 (BP Location: Left upper arm, Patient Position: Sitting)   Ht 1.753 m (5' 9\")   Wt 72.3 kg (159 lb 8 oz)   BMI 23.55 kg/m²       Constitutional: General appearance well-nourished  Genitalia:  deferred    Impression/Plan:    59 y.o. is presenting today to discuss her CT scan     : pelvic US   - right ovary: 12.9 x 13.8 x 12.2cm with complex cystic lesion measuring 10.9 x 11.3 x 10.5 cm with internal septations v low level internal echos (ORAD-4 lesion)  - left ovary 7.2 x 4.2 x 6.4 cm with septated complex cystic lesion measuring 5.5 x 3.6 x 4.9 cm (ORAD-3 lesions)    Spoke with gyn oncology at [facility] and recommended adding CEA and CMP to obtain CT abdomen/pelvis with contrast   CA-125 ordered- resulted today and spoke with patient about normal value   CEA " and CMP ordered   CT scan A/P   Has an appointment next Tuesday- at [facility]   Referral placed to [facility] gyn onc- she is going to call to scheduled     I spent total of 30 minutes on this encounter on chart review, patient care and counseling, documentation     Problem List Items Addressed This Visit    None  Visit Diagnoses         Bilateral tubo-ovarian mass    -  Primary    Relevant Orders    Ambulatory referral to Gynecologic Oncology     (Completed)    CT ABDOMEN PELVIS WITH AND WITHOUT IV CONTRAST    Comprehensive metabolic panel    CEA      S/P hysterectomy              Mitzy Mora DO

## 2025-07-01 NOTE — TELEPHONE ENCOUNTER
Department: [facility] OB/GYN   Clinical Pool: [facility] AMA[facility] CLINICAL SUPPORT P   PSR Pool: KORY AMAYA [facility] PSR P     Pt wants to discuss CT auth for ovarian mass . Please advise.

## 2025-07-02 ENCOUNTER — RESULTS FOLLOW-UP (OUTPATIENT)
Dept: OBSTETRICS AND GYNECOLOGY | Facility: CLINIC | Age: 60
End: 2025-07-02

## 2025-07-02 LAB
ALBUMIN SERPL-MCNC: 4.4 G/DL (ref 3.8–4.9)
ALP SERPL-CCNC: 53 IU/L (ref 44–121)
ALT SERPL-CCNC: 13 IU/L (ref 0–32)
AST SERPL-CCNC: 16 IU/L (ref 0–40)
BILIRUB SERPL-MCNC: 0.6 MG/DL (ref 0–1.2)
BUN SERPL-MCNC: 13 MG/DL (ref 6–24)
BUN/CREAT SERPL: 17 (ref 9–23)
CALCIUM SERPL-MCNC: 9.6 MG/DL (ref 8.7–10.2)
CEA SERPL-MCNC: 0.6 NG/ML (ref 0–4.7)
CHLORIDE SERPL-SCNC: 104 MMOL/L (ref 96–106)
CO2 SERPL-SCNC: 22 MMOL/L (ref 20–29)
CREAT SERPL-MCNC: 0.78 MG/DL (ref 0.57–1)
EGFRCR SERPLBLD CKD-EPI 2021: 87 ML/MIN/1.73
GLOBULIN SER CALC-MCNC: 2.3 G/DL (ref 1.5–4.5)
GLUCOSE SERPL-MCNC: 85 MG/DL (ref 70–99)
POTASSIUM SERPL-SCNC: 4.4 MMOL/L (ref 3.5–5.2)
PROT SERPL-MCNC: 6.7 G/DL (ref 6–8.5)
SODIUM SERPL-SCNC: 141 MMOL/L (ref 134–144)

## 2025-07-03 ENCOUNTER — HOSPITAL ENCOUNTER (OUTPATIENT)
Dept: RADIOLOGY | Facility: CLINIC | Age: 60
Discharge: HOME | End: 2025-07-03
Attending: OBSTETRICS & GYNECOLOGY
Payer: COMMERCIAL

## 2025-07-03 DIAGNOSIS — N83.8 BILATERAL TUBO-OVARIAN MASS: ICD-10-CM

## 2025-07-03 PROCEDURE — 25500000 HC DRUGS/INCIDENT RAD: Performed by: OBSTETRICS & GYNECOLOGY

## 2025-07-03 PROCEDURE — 63600105 HC IODINE BASED CONTRAST: Mod: JZ | Performed by: OBSTETRICS & GYNECOLOGY

## 2025-07-03 PROCEDURE — 74178 CT ABD&PLV WO CNTR FLWD CNTR: CPT

## 2025-07-03 RX ORDER — IOPAMIDOL 755 MG/ML
100 INJECTION, SOLUTION INTRAVASCULAR
Status: COMPLETED | OUTPATIENT
Start: 2025-07-03 | End: 2025-07-03

## 2025-07-03 RX ADMIN — IOPAMIDOL 100 ML: 755 INJECTION, SOLUTION INTRAVENOUS at 15:14

## 2025-07-03 RX ADMIN — BARIUM SULFATE 900 ML: 20 SUSPENSION ORAL at 14:52

## 2025-07-03 NOTE — PROGRESS NOTES
"Elisabet Kay   [id number]    Your doctor has referred you for a CT ABDOMEN PELVIS W WO IV CONTRAST that requires the injection of an iodinated contrast material into your bloodstream. This iodinated contrast material, sometimes referred to as \"x-ray dye\" allows for better interpretation of the x-ray films or CT images and results in a more accurate interpretation of the examination.     Without the use of iodinated contrast (x-ray dye), the examination may be less informative and result in a suboptimal examination, and possibly a delay in diagnosis and, if needed, treatment.     The iodinated contrast material is given through a small needle or catheter placed into a vein, usually on the inside of the elbow, on the back of hand, or in a vein in the foot or lower leg.    The most common, though still rare, potential reaction to an intravenous contrast injection is an allergic-like reaction. Most allergic-like reactions are mild, though a small subset of people can have more severe reactions. Mild reactions include mild / scattered hives, itching, scratchy throat, sneezing and nasal congestion. More severe reactions include facial swelling, severe difficulty breathing, wheezing and anaphylactic shock. Those with a prior history allergic-like reaction to the same class of contrast media (such as CT contrast or MRI contrast) are at the highest risk for an allergic reaction.     If you believe you had an allergic reaction to contrast in the past, please let our staff know. We can determine if this increases your risk for a future reaction and provide steps to decrease the risk. This may delay your examination, but it decreases the risk of having a new and possibly more severe reaction to the contrast injection.    People with a history of prior allergic reactions to other substances (such as unrelated medications and food) and patients with a history of asthma have slightly increased risk for an allergic reaction " from contrast material when compared with the general population, but do not require to be pretreated prior to a contrast injection.    You should notify the physician, nurse or technologist if you have ever had any of these conditions or if you have any questions.

## (undated) DEVICE — KIT CATH LAB ANGIO

## (undated) DEVICE — SELECTRA 3D LEAD DELIVERY SYSTEM 65 X 42

## (undated) DEVICE — NEEDLE PERCUTANEOUS ENTRY 18G X 7CM

## (undated) DEVICE — ***USE 121412***PACK DEVICE IMPLANT TLH

## (undated) DEVICE — SHEATH PRELUDE SNAP 9FR 13CM

## (undated) DEVICE — SHEATH PRELUDE SNAP 6FR 13CM